# Patient Record
Sex: FEMALE | Race: WHITE | Employment: UNEMPLOYED | URBAN - METROPOLITAN AREA
[De-identification: names, ages, dates, MRNs, and addresses within clinical notes are randomized per-mention and may not be internally consistent; named-entity substitution may affect disease eponyms.]

---

## 2019-03-04 ENCOUNTER — OFFICE VISIT (OUTPATIENT)
Dept: FAMILY MEDICINE CLINIC | Facility: CLINIC | Age: 64
End: 2019-03-04

## 2019-03-04 VITALS — OXYGEN SATURATION: 98 % | SYSTOLIC BLOOD PRESSURE: 158 MMHG | HEART RATE: 72 BPM | DIASTOLIC BLOOD PRESSURE: 80 MMHG

## 2019-03-04 DIAGNOSIS — Z87.891 FORMER SMOKER: ICD-10-CM

## 2019-03-04 DIAGNOSIS — Z01.30 BLOOD PRESSURE CHECK: Primary | ICD-10-CM

## 2019-03-04 RX ORDER — FOSINOPRIL SODIUM 20 MG/1
20 TABLET ORAL DAILY
COMMUNITY

## 2019-03-04 RX ORDER — BISOPROLOL FUMARATE 5 MG/1
5 TABLET ORAL DAILY
COMMUNITY

## 2019-03-05 NOTE — PROGRESS NOTES
Pt here for courtesy blood pressure check. Blood pressure medication increased 4 days ago. Blood pressure this afternoon was 140s/101 at home after eating. Worried it was still going up and wanted it checked.   Pt denies any chest pain, SOB, or pain isha

## 2023-02-27 ENCOUNTER — APPOINTMENT (OUTPATIENT)
Dept: CT IMAGING | Facility: HOSPITAL | Age: 68
End: 2023-02-27
Attending: EMERGENCY MEDICINE
Payer: MEDICARE

## 2023-02-27 ENCOUNTER — HOSPITAL ENCOUNTER (INPATIENT)
Facility: HOSPITAL | Age: 68
LOS: 3 days | Discharge: HOME HEALTH | End: 2023-03-03
Attending: EMERGENCY MEDICINE | Admitting: HOSPITALIST
Payer: MEDICARE

## 2023-02-27 ENCOUNTER — HOSPITAL ENCOUNTER (INPATIENT)
Facility: HOSPITAL | Age: 68
LOS: 3 days | Discharge: HOME | End: 2023-03-03
Attending: EMERGENCY MEDICINE | Admitting: HOSPITALIST
Payer: MEDICARE

## 2023-02-27 DIAGNOSIS — M54.31 SCIATICA OF RIGHT SIDE: Primary | ICD-10-CM

## 2023-02-27 DIAGNOSIS — R52 INTRACTABLE PAIN: ICD-10-CM

## 2023-02-27 LAB
ALBUMIN SERPL-MCNC: 4 G/DL (ref 3.4–5)
ALBUMIN/GLOB SERPL: 1 {RATIO} (ref 1–2)
ALP LIVER SERPL-CCNC: 74 U/L
ALT SERPL-CCNC: 24 U/L
ANION GAP SERPL CALC-SCNC: 11 MMOL/L (ref 0–18)
AST SERPL-CCNC: 18 U/L (ref 15–37)
BASOPHILS # BLD AUTO: 0.08 X10(3) UL (ref 0–0.2)
BASOPHILS NFR BLD AUTO: 0.7 %
BILIRUB SERPL-MCNC: 0.4 MG/DL (ref 0.1–2)
BILIRUB UR QL: NEGATIVE
BUN BLD-MCNC: 33 MG/DL (ref 7–18)
BUN/CREAT SERPL: 25.6 (ref 10–20)
CALCIUM BLD-MCNC: 9.4 MG/DL (ref 8.5–10.1)
CHLORIDE SERPL-SCNC: 106 MMOL/L (ref 98–112)
CLARITY UR: CLEAR
CO2 SERPL-SCNC: 20 MMOL/L (ref 21–32)
CREAT BLD-MCNC: 1.29 MG/DL
DEPRECATED RDW RBC AUTO: 40.5 FL (ref 35.1–46.3)
EOSINOPHIL # BLD AUTO: 0.2 X10(3) UL (ref 0–0.7)
EOSINOPHIL NFR BLD AUTO: 1.6 %
ERYTHROCYTE [DISTWIDTH] IN BLOOD BY AUTOMATED COUNT: 12.4 % (ref 11–15)
GFR SERPLBLD BASED ON 1.73 SQ M-ARVRAT: 46 ML/MIN/1.73M2 (ref 60–?)
GLOBULIN PLAS-MCNC: 4 G/DL (ref 2.8–4.4)
GLUCOSE BLD-MCNC: 161 MG/DL (ref 70–99)
GLUCOSE UR-MCNC: NORMAL MG/DL
HCT VFR BLD AUTO: 40.7 %
HGB BLD-MCNC: 13.2 G/DL
HGB UR QL STRIP.AUTO: NEGATIVE
IMM GRANULOCYTES # BLD AUTO: 0.06 X10(3) UL (ref 0–1)
IMM GRANULOCYTES NFR BLD: 0.5 %
KETONES UR-MCNC: 100 MG/DL
LEUKOCYTE ESTERASE UR QL STRIP.AUTO: 500
LIPASE SERPL-CCNC: 123 U/L (ref 73–393)
LIPASE SERPL-CCNC: 31 U/L (ref 13–75)
LYMPHOCYTES # BLD AUTO: 1.91 X10(3) UL (ref 1–4)
LYMPHOCYTES NFR BLD AUTO: 15.7 %
MCH RBC QN AUTO: 28.8 PG (ref 26–34)
MCHC RBC AUTO-ENTMCNC: 32.4 G/DL (ref 31–37)
MCV RBC AUTO: 88.7 FL
MONOCYTES # BLD AUTO: 0.68 X10(3) UL (ref 0.1–1)
MONOCYTES NFR BLD AUTO: 5.6 %
NEUTROPHILS # BLD AUTO: 9.25 X10 (3) UL (ref 1.5–7.7)
NEUTROPHILS # BLD AUTO: 9.25 X10(3) UL (ref 1.5–7.7)
NEUTROPHILS NFR BLD AUTO: 75.9 %
NITRITE UR QL STRIP.AUTO: NEGATIVE
OSMOLALITY SERPL CALC.SUM OF ELEC: 295 MOSM/KG (ref 275–295)
PH UR: 5 [PH] (ref 5–8)
PLATELET # BLD AUTO: 286 10(3)UL (ref 150–450)
POTASSIUM SERPL-SCNC: 3.6 MMOL/L (ref 3.5–5.1)
PROT SERPL-MCNC: 8 G/DL (ref 6.4–8.2)
PROT UR-MCNC: 20 MG/DL
RBC # BLD AUTO: 4.59 X10(6)UL
SODIUM SERPL-SCNC: 137 MMOL/L (ref 136–145)
SP GR UR STRIP: 1.02 (ref 1–1.03)
UROBILINOGEN UR STRIP-ACNC: NORMAL
WBC # BLD AUTO: 12.2 X10(3) UL (ref 4–11)

## 2023-02-27 PROCEDURE — 74177 CT ABD & PELVIS W/CONTRAST: CPT | Performed by: EMERGENCY MEDICINE

## 2023-02-27 RX ORDER — HYDROCODONE BITARTRATE AND ACETAMINOPHEN 5; 325 MG/1; MG/1
1-2 TABLET ORAL EVERY 4 HOURS PRN
Qty: 15 TABLET | Refills: 0 | Status: SHIPPED | OUTPATIENT
Start: 2023-02-27 | End: 2023-03-03

## 2023-02-27 RX ORDER — MORPHINE SULFATE 4 MG/ML
4 INJECTION, SOLUTION INTRAMUSCULAR; INTRAVENOUS ONCE
Status: COMPLETED | OUTPATIENT
Start: 2023-02-27 | End: 2023-02-27

## 2023-02-27 RX ORDER — METHYLPREDNISOLONE 4 MG/1
TABLET ORAL
Qty: 1 EACH | Refills: 0 | Status: SHIPPED | OUTPATIENT
Start: 2023-02-27 | End: 2023-03-03

## 2023-02-27 RX ORDER — ONDANSETRON 2 MG/ML
4 INJECTION INTRAMUSCULAR; INTRAVENOUS ONCE
Status: COMPLETED | OUTPATIENT
Start: 2023-02-27 | End: 2023-02-27

## 2023-02-27 RX ORDER — KETOROLAC TROMETHAMINE 30 MG/ML
30 INJECTION, SOLUTION INTRAMUSCULAR; INTRAVENOUS ONCE
Status: COMPLETED | OUTPATIENT
Start: 2023-02-27 | End: 2023-02-27

## 2023-02-27 RX ORDER — DIAZEPAM 5 MG/ML
5 INJECTION, SOLUTION INTRAMUSCULAR; INTRAVENOUS ONCE
Status: COMPLETED | OUTPATIENT
Start: 2023-02-27 | End: 2023-02-27

## 2023-02-28 PROBLEM — R52 INTRACTABLE PAIN: Status: ACTIVE | Noted: 2023-02-28

## 2023-02-28 PROBLEM — M54.31 SCIATICA OF RIGHT SIDE: Status: ACTIVE | Noted: 2023-02-28

## 2023-02-28 LAB
ATRIAL RATE: 78 BPM
P AXIS: 57 DEGREES
P-R INTERVAL: 140 MS
Q-T INTERVAL: 420 MS
QRS DURATION: 84 MS
QTC CALCULATION (BEZET): 478 MS
R AXIS: 40 DEGREES
SARS-COV-2 RNA RESP QL NAA+PROBE: NOT DETECTED
T AXIS: 28 DEGREES
VENTRICULAR RATE: 78 BPM

## 2023-02-28 PROCEDURE — 99222 1ST HOSP IP/OBS MODERATE 55: CPT | Performed by: HOSPITALIST

## 2023-02-28 RX ORDER — MORPHINE SULFATE 2 MG/ML
2 INJECTION, SOLUTION INTRAMUSCULAR; INTRAVENOUS EVERY 2 HOUR PRN
Status: DISCONTINUED | OUTPATIENT
Start: 2023-02-28 | End: 2023-03-03

## 2023-02-28 RX ORDER — DEXAMETHASONE SODIUM PHOSPHATE 10 MG/ML
10 INJECTION, SOLUTION INTRAMUSCULAR; INTRAVENOUS ONCE
Status: COMPLETED | OUTPATIENT
Start: 2023-02-28 | End: 2023-02-28

## 2023-02-28 RX ORDER — LISINOPRIL 10 MG/1
10 TABLET ORAL DAILY
Status: DISCONTINUED | OUTPATIENT
Start: 2023-02-28 | End: 2023-03-03

## 2023-02-28 RX ORDER — METOPROLOL TARTRATE 50 MG/1
50 TABLET, FILM COATED ORAL
Status: DISCONTINUED | OUTPATIENT
Start: 2023-02-28 | End: 2023-03-03

## 2023-02-28 RX ORDER — DEXAMETHASONE SODIUM PHOSPHATE 4 MG/ML
4 VIAL (ML) INJECTION EVERY 12 HOURS
Status: DISCONTINUED | OUTPATIENT
Start: 2023-02-28 | End: 2023-03-03

## 2023-02-28 RX ORDER — DOCUSATE SODIUM 100 MG/1
100 CAPSULE, LIQUID FILLED ORAL 2 TIMES DAILY
Status: DISCONTINUED | OUTPATIENT
Start: 2023-02-28 | End: 2023-03-03

## 2023-02-28 RX ORDER — MAGNESIUM HYDROXIDE/ALUMINUM HYDROXICE/SIMETHICONE 120; 1200; 1200 MG/30ML; MG/30ML; MG/30ML
30 SUSPENSION ORAL 4 TIMES DAILY PRN
Status: DISCONTINUED | OUTPATIENT
Start: 2023-02-28 | End: 2023-03-03

## 2023-02-28 RX ORDER — DEXAMETHASONE SODIUM PHOSPHATE 4 MG/ML
6 VIAL (ML) INJECTION DAILY
Status: DISCONTINUED | OUTPATIENT
Start: 2023-02-28 | End: 2023-02-28

## 2023-02-28 RX ORDER — ACETAMINOPHEN 325 MG/1
650 TABLET ORAL EVERY 6 HOURS PRN
Status: DISCONTINUED | OUTPATIENT
Start: 2023-02-28 | End: 2023-03-03

## 2023-02-28 RX ORDER — LORAZEPAM 2 MG/ML
2 INJECTION INTRAMUSCULAR ONCE
Status: COMPLETED | OUTPATIENT
Start: 2023-02-28 | End: 2023-03-01

## 2023-02-28 RX ORDER — ZOLPIDEM TARTRATE 5 MG/1
5 TABLET ORAL NIGHTLY PRN
Status: DISCONTINUED | OUTPATIENT
Start: 2023-02-28 | End: 2023-03-03

## 2023-02-28 RX ORDER — ONDANSETRON 2 MG/ML
4 INJECTION INTRAMUSCULAR; INTRAVENOUS EVERY 6 HOURS PRN
Status: DISCONTINUED | OUTPATIENT
Start: 2023-02-28 | End: 2023-03-03

## 2023-02-28 RX ORDER — PANTOPRAZOLE SODIUM 40 MG/1
40 TABLET, DELAYED RELEASE ORAL
Status: DISCONTINUED | OUTPATIENT
Start: 2023-02-28 | End: 2023-03-03

## 2023-02-28 RX ORDER — MORPHINE SULFATE 4 MG/ML
4 INJECTION, SOLUTION INTRAMUSCULAR; INTRAVENOUS EVERY 2 HOUR PRN
Status: DISCONTINUED | OUTPATIENT
Start: 2023-02-28 | End: 2023-03-03

## 2023-02-28 RX ORDER — CYCLOBENZAPRINE HCL 10 MG
10 TABLET ORAL 3 TIMES DAILY PRN
Status: DISCONTINUED | OUTPATIENT
Start: 2023-02-28 | End: 2023-03-03

## 2023-02-28 RX ORDER — HYDRALAZINE HYDROCHLORIDE 20 MG/ML
10 INJECTION INTRAMUSCULAR; INTRAVENOUS EVERY 4 HOURS PRN
Status: DISCONTINUED | OUTPATIENT
Start: 2023-02-28 | End: 2023-03-03

## 2023-02-28 RX ORDER — HYDROCODONE BITARTRATE AND ACETAMINOPHEN 5; 325 MG/1; MG/1
1 TABLET ORAL EVERY 6 HOURS PRN
Status: DISCONTINUED | OUTPATIENT
Start: 2023-02-28 | End: 2023-03-03

## 2023-02-28 NOTE — ED QUICK NOTES
Pt unable to ambulate from cart to wheelchair. ED MD aware, plan of care is for pt to be admitted for pain control.

## 2023-02-28 NOTE — ED QUICK NOTES
Pt ambulatory to the bathroom with assistance with steady gait. Urine collected and sent to lab. Pt reports nausea, medicated per orders. Pt taken to CT in stable condition.

## 2023-02-28 NOTE — PLAN OF CARE
Alert and oriented x4. Right sided sciatica pain. 1 assist to bathroom. PRN Norco for pain and hydralazine for blood pressure. CT abdomen = negative. Decadron q 12. Plan for home once medically cleared. Problem: Patient Centered Care  Goal: Patient preferences are identified and integrated in the patient's plan of care  Description: Interventions:  - What would you like us to know as we care for you?  Home with son  - Provide timely, complete, and accurate information to patient/family  - Incorporate patient and family knowledge, values, beliefs, and cultural backgrounds into the planning and delivery of care  - Encourage patient/family to participate in care and decision-making at the level they choose  - Honor patient and family perspectives and choices  Outcome: Progressing     Problem: Patient/Family Goals  Goal: Patient/Family Long Term Goal  Description: Patient's Long Term Goal: safe ambulation    Interventions:  - calling for assistance out of bed  - See additional Care Plan goals for specific interventions  Outcome: Progressing  Goal: Patient/Family Short Term Goal  Description: Patient's Short Term Goal: pain management    Interventions:   - pain administration  - See additional Care Plan goals for specific interventions  Outcome: Progressing     Problem: PAIN - ADULT  Goal: Verbalizes/displays adequate comfort level or patient's stated pain goal  Description: INTERVENTIONS:  - Encourage pt to monitor pain and request assistance  - Assess pain using appropriate pain scale  - Administer analgesics based on type and severity of pain and evaluate response  - Implement non-pharmacological measures as appropriate and evaluate response  - Consider cultural and social influences on pain and pain management  - Manage/alleviate anxiety  - Utilize distraction and/or relaxation techniques  - Monitor for opioid side effects  - Notify MD/LIP if interventions unsuccessful or patient reports new pain  - Anticipate increased pain with activity and pre-medicate as appropriate  Outcome: Progressing     Problem: RISK FOR INFECTION - ADULT  Goal: Absence of fever/infection during anticipated neutropenic period  Description: INTERVENTIONS  - Monitor WBC  - Administer growth factors as ordered  - Implement neutropenic guidelines  Outcome: Progressing     Problem: SAFETY ADULT - FALL  Goal: Free from fall injury  Description: INTERVENTIONS:  - Assess pt frequently for physical needs  - Identify cognitive and physical deficits and behaviors that affect risk of falls.   - [place] fall precautions as indicated by assessment.  - Educate pt/family on patient safety including physical limitations  - Instruct pt to call for assistance with activity based on assessment  - Modify environment to reduce risk of injury  - Provide assistive devices as appropriate  - Consider OT/PT consult to assist with strengthening/mobility  - Encourage toileting schedule  Outcome: Progressing

## 2023-02-28 NOTE — ED QUICK NOTES
Pt updated on room assignment, transported to floor by this RN in stable condition. Pt remains A&Ox4, respirations even and unlabored, skin warm and dry.

## 2023-02-28 NOTE — PHYSICAL THERAPY NOTE
PT order received, chart reviewed. Spoke with RN Abhay Flores who approves participation. Patient presents sleeping VERY SOUNDLY in bed, sister at bedside. Pt does not awaken to her name or loud talking. Sister reports patient did not sleep well last night and just had some breakfast and fell asleep. Will check back later, RN Is aware.

## 2023-02-28 NOTE — DISCHARGE INSTRUCTIONS
Take medications as prescribed. You may take Tylenol as needed for pain. Take Norco as needed for worsening pain. Follow-up with your primary physician for further evaluation and treatment. Return to the emergency department if increasing pain, focal weakness, or other new symptoms develop. Sometimes managing your health at home requires assistance. The [facility] team has recognized your preference to use [facility]. They can be reached by phone at [phone]. The fax number for your reference is [phone]. A representative from the home health agency will contact you or your family to schedule your first visit.

## 2023-02-28 NOTE — OCCUPATIONAL THERAPY NOTE
OT orders received and chart reviewed. RN approving of pt participation in therapy. Pt received in bed sleeping soundly and not readily aroused to name/conversation. Per sister, pt had not slept well last night. OT eval deferred.  Will re attempt

## 2023-03-01 ENCOUNTER — APPOINTMENT (OUTPATIENT)
Dept: MRI IMAGING | Facility: HOSPITAL | Age: 68
End: 2023-03-01
Attending: HOSPITALIST
Payer: MEDICARE

## 2023-03-01 LAB
ANION GAP SERPL CALC-SCNC: 7 MMOL/L (ref 0–18)
BASOPHILS # BLD AUTO: 0.01 X10(3) UL (ref 0–0.2)
BASOPHILS NFR BLD AUTO: 0.1 %
BUN BLD-MCNC: 32 MG/DL (ref 7–18)
BUN/CREAT SERPL: 30.2 (ref 10–20)
CALCIUM BLD-MCNC: 9.2 MG/DL (ref 8.5–10.1)
CHLORIDE SERPL-SCNC: 110 MMOL/L (ref 98–112)
CO2 SERPL-SCNC: 24 MMOL/L (ref 21–32)
CREAT BLD-MCNC: 1.06 MG/DL
DEPRECATED RDW RBC AUTO: 43.5 FL (ref 35.1–46.3)
EOSINOPHIL # BLD AUTO: 0 X10(3) UL (ref 0–0.7)
EOSINOPHIL NFR BLD AUTO: 0 %
ERYTHROCYTE [DISTWIDTH] IN BLOOD BY AUTOMATED COUNT: 12.9 % (ref 11–15)
GFR SERPLBLD BASED ON 1.73 SQ M-ARVRAT: 58 ML/MIN/1.73M2 (ref 60–?)
GLUCOSE BLD-MCNC: 159 MG/DL (ref 70–99)
HCT VFR BLD AUTO: 38.7 %
HGB BLD-MCNC: 12.5 G/DL
IMM GRANULOCYTES # BLD AUTO: 0.11 X10(3) UL (ref 0–1)
IMM GRANULOCYTES NFR BLD: 0.7 %
LYMPHOCYTES # BLD AUTO: 1.17 X10(3) UL (ref 1–4)
LYMPHOCYTES NFR BLD AUTO: 7.3 %
MCH RBC QN AUTO: 29.6 PG (ref 26–34)
MCHC RBC AUTO-ENTMCNC: 32.3 G/DL (ref 31–37)
MCV RBC AUTO: 91.7 FL
MONOCYTES # BLD AUTO: 0.8 X10(3) UL (ref 0.1–1)
MONOCYTES NFR BLD AUTO: 5 %
NEUTROPHILS # BLD AUTO: 13.98 X10 (3) UL (ref 1.5–7.7)
NEUTROPHILS # BLD AUTO: 13.98 X10(3) UL (ref 1.5–7.7)
NEUTROPHILS NFR BLD AUTO: 86.9 %
OSMOLALITY SERPL CALC.SUM OF ELEC: 302 MOSM/KG (ref 275–295)
PLATELET # BLD AUTO: 270 10(3)UL (ref 150–450)
POTASSIUM SERPL-SCNC: 4.3 MMOL/L (ref 3.5–5.1)
PROCALCITONIN SERPL-MCNC: <0.02 NG/ML (ref ?–0.16)
RBC # BLD AUTO: 4.22 X10(6)UL
SODIUM SERPL-SCNC: 141 MMOL/L (ref 136–145)
WBC # BLD AUTO: 16.1 X10(3) UL (ref 4–11)

## 2023-03-01 PROCEDURE — 72148 MRI LUMBAR SPINE W/O DYE: CPT | Performed by: HOSPITALIST

## 2023-03-01 PROCEDURE — 99233 SBSQ HOSP IP/OBS HIGH 50: CPT | Performed by: HOSPITALIST

## 2023-03-01 NOTE — CM/SW NOTE
03/01/23 0900   CM/SW Screening   Referral [phone] staff; Chart review;Nursing rounds   Patient's Current Mental Status at Time of Assessment Alert;Oriented   Patient's [address]   Number of Levels in Home 1   Patient lives with Spouse/Significant other; Son   Patient Status Prior to Admission   Independent with ADLs and Mobility Yes   Discharge Needs   Anticipated D/C needs Home health care     Cm spoke with pt re dc needs and PT recs. Pt has no DME     and son both Rafael Langston is a , son works during the day but, is home at night. Pt is not agreeable to [facility]. Pt sts she has her sister/friend/cousin who can stay with her at [place]. Pt is agreeable to [facility]. PCP is Dr Sharyle Aus on [address] [place] [address] in [facility]. [address] [place] [address] to send ref, f2f for RN/PT done    Plan  Home with [facility] pending choice    / to remain available for support and/or discharge planning.      Leslye Diaz RN    Ext [phone]

## 2023-03-01 NOTE — PLAN OF CARE
Problem: Patient Centered Care  Goal: Patient preferences are identified and integrated in the patient's plan of care  Description: Interventions:  - What would you like us to know as we care for you. Lives with her  and he's her support system. - Provide timely, complete, and accurate information to patient/family  - Incorporate patient and family knowledge, values, beliefs, and cultural backgrounds into the planning and delivery of care  - Encourage patient/family to participate in care and decision-making at the level they choose  - Honor patient and family perspectives and choices  Outcome: Progressing     Problem: Patient/Family Goals  Goal: Patient/Family Long Term Goal  Description: Patient's Long Term Goal: Pain improved on her right lower back and able to walk well without any problems. Interventions:  - Up with walker as much as tolerated at home. - No bending, heavy lifting nor twisting above waist till cleared.  - PT/OT as recommended. - Pain management with oral medication.  - Report any other problems of back pain or walking.  - See additional Care Plan goals for specific interventions  Outcome: Progressing  Goal: Patient/Family Short Term Goal  Description: Patient's Short Term Goal: Home when stable. Interventions:   - PT/OT as ordered. - Pain management with oral medication.  - Up with walker as much as tolerated.   - Fall precaution.  - See additional Care Plan goals for specific interventions  Outcome: Progressing     Problem: PAIN - ADULT  Goal: Verbalizes/displays adequate comfort level or patient's stated pain goal  Description: INTERVENTIONS:  - Encourage pt to monitor pain and request assistance  - Assess pain using appropriate pain scale  - Administer analgesics based on type and severity of pain and evaluate response  - Implement non-pharmacological measures as appropriate and evaluate response  - Consider cultural and social influences on pain and pain management  - Manage/alleviate anxiety  - Utilize distraction and/or relaxation techniques  - Monitor for opioid side effects  - Notify MD/LIP if interventions unsuccessful or patient reports new pain  - Anticipate increased pain with activity and pre-medicate as appropriate  Outcome: Progressing     Problem: RISK FOR INFECTION - ADULT  Goal: Absence of fever/infection during anticipated neutropenic period  Description: INTERVENTIONS  - Monitor WBC  - Administer growth factors as ordered  - Implement neutropenic guidelines  Outcome: Progressing     Problem: SAFETY ADULT - FALL  Goal: Free from fall injury  Description: INTERVENTIONS:  - Assess pt frequently for physical needs  - Identify cognitive and physical deficits and behaviors that affect risk of falls.   - [place] fall precautions as indicated by assessment.  - Educate pt/family on patient safety including physical limitations  - Instruct pt to call for assistance with activity based on assessment  - Modify environment to reduce risk of injury  - Provide assistive devices as appropriate  - Consider OT/PT consult to assist with strengthening/mobility  - Encourage toileting schedule  Outcome: Progressing     Problem: DISCHARGE PLANNING  Goal: Discharge to home or other facility with appropriate resources  Description: INTERVENTIONS:  - Identify barriers to discharge w/pt and caregiver  - Include patient/family/discharge partner in discharge planning  - Arrange for needed discharge resources and transportation as appropriate  - Identify discharge learning needs (meds, wound care, etc)  - Arrange for interpreters to assist at discharge as needed  - Consider post-discharge preferences of patient/family/discharge partner  - Complete POLST form as appropriate  - Assess patient's ability to be responsible for managing their own health  - Refer to Case Management Department for coordinating discharge planning if the patient needs post-hospital services based on physician/LIP order or complex needs related to functional status, cognitive ability or social support system  Outcome: Progressing     Problem: MUSCULOSKELETAL - ADULT  Goal: Return mobility to safest level of function  Description: INTERVENTIONS:  - Assess patient stability and activity tolerance for standing, transferring and ambulating w/ or w/o assistive devices  - Assist with transfers and ambulation using safe patient handling equipment as needed  - Ensure adequate protection for wounds/incisions during mobilization  - Obtain PT/OT consults as needed  - Advance activity as appropriate  - Communicate ordered activity level and limitations with patient/family  Outcome: Progressing  Goal: Maintain proper alignment of affected body part  Description: INTERVENTIONS:  - Support and protect limb and body alignment per provider's orders  - Instruct and reinforce with patient and family use of appropriate assistive device and precautions (e.g. spinal or hip dislocation precautions)  Outcome: Progressing    Patient is alert and oriented, aware to call for help as needed. Patient is currently in room air, denies shortness of breathing nor chest pain. Patient is up with walker with minimal supervision from staff. Patient is voiding well, passing gas but denies having a bowel movement. Patient will be going home when stable.

## 2023-03-01 NOTE — PHYSICAL THERAPY NOTE
PHYSICAL THERAPY TREATMENT NOTE - INPATIENT     Room Number: 433/433-A       Presenting Problem: Intractable pain and inabiltiy to stand or ambulate, severe right sided sciatica/RLQ pain x 10 days    Problem List  Principal Problem:    Sciatica of right side  Active Problems:    Intractable pain      PHYSICAL THERAPY ASSESSMENT   Chart reviewed. RN  approved participation in physical therapy. PPE worn by therapist: mask, gloves and goggles. Patient was wearing a mask during session. Patient presented in bed with 6/10 pain. Patient with good  progress towards goals during this session. Education provided on Spine precautions, Physical therapy plan of care and physiological benefits of out of bed mobility. Patient with good carryover. Bed mobility: Min assist  Transfers: Min assist  Gait Assistance: Minimum assistance  Distance (ft): 2 x 100  Assistive Device: Rolling walker  Pattern: R Decreased stance time          Pt seen daily. Min a for bed mobility and transfer;Extra time provided to complete task. EOB sitting balance activity with emphasis on core stabilization. Spinal precautions reviewed;education;pt recall 2/3 spinal precautions. Pt amb 2 x 100 ft with RW and min a;provided cuing for gait pattern as well as for postural awareness. Navigated 4 stairs with min a. Gentle balance activity performed to maximize safety with functional mobility. Ther  Ex. Patient was left in bedside chair at end of session with all needs in reach. The patient's Approx Degree of Impairment: 54.16% has been calculated based on documentation in the [facility] '6 clicks' Inpatient Basic Mobility Short Form. Research supports that patients with this level of impairment may benefit from Undetermined. . RN aware of patient status post session. DISCHARGE RECOMMENDATIONS  PT Discharge Recommendations: Undetermined     PLAN  PT Treatment Plan: Bed mobility; Body mechanics    SUBJECTIVE  Pt reports being ready for PT RX    OBJECTIVE  Precautions: Spine    WEIGHT BEARING RESTRICTION  Weight Bearing Restriction: None                PAIN ASSESSMENT   Ratin  Location: right low back wrapping around pelvis to abdomen, L1-L2 dermatome  Management Techniques: Activity promotion; Body mechanics; Relaxation;Repositioning; Other (Comment) (use of ice, centralization of pain)    BALANCE                                                                                                                       Static Sitting: Good  Dynamic Sitting: Fair +           Static Standing: Poor +  Dynamic Standing: Poor +    ACTIVITY TOLERANCE                         O2 WALK       AM-PAC '6-Clicks' INPATIENT SHORT FORM - BASIC MOBILITY  How much difficulty does the patient currently have. .. Patient Difficulty: Turning over in bed (including adjusting bedclothes, sheets and blankets)?: A Little   Patient Difficulty: Sitting down on and standing up from a chair with arms (e.g., wheelchair, bedside commode, etc.): A Little   Patient Difficulty: Moving from lying on back to sitting on the side of the bed?: A Little   How much help from another person does the patient currently need. .. Help from Another: Moving to and from a bed to a chair (including a wheelchair)?: A Little   Help from Another: Need to walk in hospital room?: A Lot   Help from Another: Climbing 3-5 steps with a railing?: A Lot     AM-PAC Score:  Raw Score: 16   Approx Degree of Impairment: 54.16%   Standardized Score (AM-PAC Scale): 40.78   CMS Modifier (G-Code): CK      Additional information:   THERAPEUTIC EXERCISES  Lower Extremity Ankle pumps  Glut sets  Quad sets     Position Supine       Patient End of Session: Up in chair;Call light within reach;RN aware of session/findings;Bracing education provided per handout; All patient questions and concerns addressed    CURRENT GOALS       Patient Goal Patient's self-stated goal is: to not have pain and go home   Goal #1 Patient is able to demonstrate supine - sit EOB @ level: modified independent     Goal #1   Current Status Min a   Goal #2 Patient is able to demonstrate transfers Sit to/from Stand at assistance level: modified independent with walker - rolling     Goal #2  Current Status Min a   Goal #3 Patient is able to ambulate 100 feet with assist device: walker - rolling at assistance level: supervision   Goal #3   Current Status Pt amb 2 x 100 ft with RW and min a   Goal #4 Patient will negotiate 6 stairs/one curb w/ assistive device and supervision   Goal #4   Current Status Navigated 4 stairs with 2 HR/Min a   Goal #5 Patient to demonstrate independence with home activity/exercise instructions provided to patient in preparation for discharge.    Goal #5   Current Status    Goal #6    Goal #6  Current Status            PT Session Time: 45 minutes  Gait Training: 15 minutes  Therapeutic Activity: 15 minutes  Therapeutic Exercise: 15 minutes

## 2023-03-01 NOTE — PLAN OF CARE
Alert and oriented x4. Right sided sciatica pain. Standy by walker to bathroom. Complaints of pain. Blood pressure WNL. Norco and muscle relaxant for pain. Plan for MRI spine tomorrow. Home once medically cleared  Problem: Patient Centered Care  Goal: Patient preferences are identified and integrated in the patient's plan of care  Description: Interventions:  - What would you like us to know as we care for you?  Home with son  - Provide timely, complete, and accurate information to patient/family  - Incorporate patient and family knowledge, values, beliefs, and cultural backgrounds into the planning and delivery of care  - Encourage patient/family to participate in care and decision-making at the level they choose  - Honor patient and family perspectives and choices  Outcome: Progressing     Problem: Patient/Family Goals  Goal: Patient/Family Long Term Goal  Description: Patient's Long Term Goal: safe ambulation    Interventions:  - calling for assistance  - See additional Care Plan goals for specific interventions  Outcome: Progressing  Goal: Patient/Family Short Term Goal  Description: Patient's Short Term Goal: pain management    Interventions:   - pain administration  - See additional Care Plan goals for specific interventions  Outcome: Progressing     Problem: PAIN - ADULT  Goal: Verbalizes/displays adequate comfort level or patient's stated pain goal  Description: INTERVENTIONS:  - Encourage pt to monitor pain and request assistance  - Assess pain using appropriate pain scale  - Administer analgesics based on type and severity of pain and evaluate response  - Implement non-pharmacological measures as appropriate and evaluate response  - Consider cultural and social influences on pain and pain management  - Manage/alleviate anxiety  - Utilize distraction and/or relaxation techniques  - Monitor for opioid side effects  - Notify MD/LIP if interventions unsuccessful or patient reports new pain  - Anticipate increased pain with activity and pre-medicate as appropriate  Outcome: Progressing     Problem: RISK FOR INFECTION - ADULT  Goal: Absence of fever/infection during anticipated neutropenic period  Description: INTERVENTIONS  - Monitor WBC  - Administer growth factors as ordered  - Implement neutropenic guidelines  Outcome: Progressing     Problem: SAFETY ADULT - FALL  Goal: Free from fall injury  Description: INTERVENTIONS:  - Assess pt frequently for physical needs  - Identify cognitive and physical deficits and behaviors that affect risk of falls.   - [place] fall precautions as indicated by assessment.  - Educate pt/family on patient safety including physical limitations  - Instruct pt to call for assistance with activity based on assessment  - Modify environment to reduce risk of injury  - Provide assistive devices as appropriate  - Consider OT/PT consult to assist with strengthening/mobility  - Encourage toileting schedule  Outcome: Progressing

## 2023-03-01 NOTE — HOME CARE LIAISON
Received referral via [employer] for Home Health services. Spoke w/ patient and discussed list of [facility] providers from [address] [place] [address] Road, choice is Loc 33. Agency reserved in [address] [place] [address] Road and contact information placed on AVS.Financial interest disclosure provided. Notified CM/Iva.

## 2023-03-01 NOTE — CM/SW NOTE
Department  notified of request for [facility], aidin referrals started. Assigned CM/SW to follow up with pt/family on further discharge planning.      Ryanne Katz   March 01, 2023   09:23

## 2023-03-02 ENCOUNTER — APPOINTMENT (OUTPATIENT)
Dept: GENERAL RADIOLOGY | Facility: HOSPITAL | Age: 68
End: 2023-03-02
Attending: ANESTHESIOLOGY
Payer: MEDICARE

## 2023-03-02 LAB
ANION GAP SERPL CALC-SCNC: 5 MMOL/L (ref 0–18)
BASOPHILS # BLD AUTO: 0.02 X10(3) UL (ref 0–0.2)
BASOPHILS NFR BLD AUTO: 0.1 %
BUN BLD-MCNC: 33 MG/DL (ref 7–18)
BUN/CREAT SERPL: 37.9 (ref 10–20)
CALCIUM BLD-MCNC: 8.7 MG/DL (ref 8.5–10.1)
CHLORIDE SERPL-SCNC: 109 MMOL/L (ref 98–112)
CO2 SERPL-SCNC: 26 MMOL/L (ref 21–32)
CREAT BLD-MCNC: 0.87 MG/DL
DEPRECATED RDW RBC AUTO: 41.9 FL (ref 35.1–46.3)
EOSINOPHIL # BLD AUTO: 0 X10(3) UL (ref 0–0.7)
EOSINOPHIL NFR BLD AUTO: 0 %
ERYTHROCYTE [DISTWIDTH] IN BLOOD BY AUTOMATED COUNT: 12.7 % (ref 11–15)
GFR SERPLBLD BASED ON 1.73 SQ M-ARVRAT: 74 ML/MIN/1.73M2 (ref 60–?)
GLUCOSE BLD-MCNC: 132 MG/DL (ref 70–99)
HCT VFR BLD AUTO: 38.7 %
HGB BLD-MCNC: 12.6 G/DL
IMM GRANULOCYTES # BLD AUTO: 0.11 X10(3) UL (ref 0–1)
IMM GRANULOCYTES NFR BLD: 0.8 %
LYMPHOCYTES # BLD AUTO: 1.65 X10(3) UL (ref 1–4)
LYMPHOCYTES NFR BLD AUTO: 12.3 %
MAGNESIUM SERPL-MCNC: 2.4 MG/DL (ref 1.6–2.6)
MCH RBC QN AUTO: 29.4 PG (ref 26–34)
MCHC RBC AUTO-ENTMCNC: 32.6 G/DL (ref 31–37)
MCV RBC AUTO: 90.2 FL
MONOCYTES # BLD AUTO: 0.92 X10(3) UL (ref 0.1–1)
MONOCYTES NFR BLD AUTO: 6.9 %
NEUTROPHILS # BLD AUTO: 10.71 X10 (3) UL (ref 1.5–7.7)
NEUTROPHILS # BLD AUTO: 10.71 X10(3) UL (ref 1.5–7.7)
NEUTROPHILS NFR BLD AUTO: 79.9 %
OSMOLALITY SERPL CALC.SUM OF ELEC: 299 MOSM/KG (ref 275–295)
PHOSPHATE SERPL-MCNC: 3.1 MG/DL (ref 2.5–4.9)
PLATELET # BLD AUTO: 296 10(3)UL (ref 150–450)
POTASSIUM SERPL-SCNC: 3.9 MMOL/L (ref 3.5–5.1)
RBC # BLD AUTO: 4.29 X10(6)UL
SODIUM SERPL-SCNC: 140 MMOL/L (ref 136–145)
WBC # BLD AUTO: 13.4 X10(3) UL (ref 4–11)

## 2023-03-02 PROCEDURE — 3E0R33Z INTRODUCTION OF ANTI-INFLAMMATORY INTO SPINAL CANAL, PERCUTANEOUS APPROACH: ICD-10-PCS | Performed by: ANESTHESIOLOGY

## 2023-03-02 PROCEDURE — 99233 SBSQ HOSP IP/OBS HIGH 50: CPT | Performed by: HOSPITALIST

## 2023-03-02 RX ORDER — LIDOCAINE HYDROCHLORIDE 10 MG/ML
INJECTION, SOLUTION EPIDURAL; INFILTRATION; INTRACAUDAL; PERINEURAL AS NEEDED
Status: DISCONTINUED | OUTPATIENT
Start: 2023-03-02 | End: 2023-03-02 | Stop reason: HOSPADM

## 2023-03-02 RX ORDER — TRIAMCINOLONE ACETONIDE 40 MG/ML
INJECTION, SUSPENSION INTRA-ARTICULAR; INTRAMUSCULAR AS NEEDED
Status: DISCONTINUED | OUTPATIENT
Start: 2023-03-02 | End: 2023-03-02 | Stop reason: HOSPADM

## 2023-03-02 NOTE — PHYSICAL THERAPY NOTE
PHYSICAL THERAPY TREATMENT NOTE - INPATIENT     Room Number: 433/433-A       Presenting Problem: Intractable pain and inabiltiy to stand or ambulate, severe right sided sciatica/RLQ pain x 10 days    Problem List  Principal Problem:    Sciatica of right side  Active Problems:    Intractable pain      PHYSICAL THERAPY ASSESSMENT   Chart reviewed. RN  approved participation in physical therapy. PPE worn by therapist: mask, gloves and goggles. Patient was wearing a mask during session. Patient presented in bed with 6/10 pain. Patient with good  progress towards goals during this session. Education provided on Spine precautions, Physical therapy plan of care and physiological benefits of out of bed mobility. Patient with good carryover. Bed mobility: Min assist  Transfers: Min assist  Gait Assistance: Contact guard assist  Distance (ft): 2 x 100  Assistive Device: Rolling walker  Pattern: R Decreased stance time          Pt seen daily. Min a for bed mobility and transfer;Extra time provided to complete task. EOB sitting balance activity with emphasis on core stabilization. Spinal precautions reviewed;education;pt recall 3/3 spinal precautions. Pt amb 2 x 100 ft with RW and CGA;provided cuing for gait pattern as well as for postural awareness. Navigated  12 stairs with CGA. Gentle balance activity performed to maximize safety with functional mobility. Ther  Ex. Patient was left in bedside chair at end of session with all needs in reach. The patient's Approx Degree of Impairment: 46.58% has been calculated based on documentation in the [facility] '6 clicks' Inpatient Basic Mobility Short Form. Research supports that patients with this level of impairment may benefit from Undetermined. . RN aware of patient status post session. DISCHARGE RECOMMENDATIONS  PT Discharge Recommendations: Undetermined     PLAN  PT Treatment Plan: Bed mobility; Body mechanics; Endurance; Patient education;Gait training    SUBJECTIVE  Pt reports being ready for PT RX    OBJECTIVE  Precautions: Spine    WEIGHT BEARING RESTRICTION  Weight Bearing Restriction: None                PAIN ASSESSMENT   Ratin  Location: right low back wrapping around pelvis to abdomen, L1-L2 dermatome  Management Techniques: Activity promotion; Body mechanics; Relaxation;Repositioning; Other (Comment) (use of ice, centralization of pain)    BALANCE                                                                                                                       Static Sitting: Good  Dynamic Sitting: Fair +           Static Standing: Poor +  Dynamic Standing: Poor +    ACTIVITY TOLERANCE                         O2 WALK       AM-PAC '6-Clicks' INPATIENT SHORT FORM - BASIC MOBILITY  How much difficulty does the patient currently have. .. Patient Difficulty: Turning over in bed (including adjusting bedclothes, sheets and blankets)?: A Little   Patient Difficulty: Sitting down on and standing up from a chair with arms (e.g., wheelchair, bedside commode, etc.): A Little   Patient Difficulty: Moving from lying on back to sitting on the side of the bed?: A Little   How much help from another person does the patient currently need. .. Help from Another: Moving to and from a bed to a chair (including a wheelchair)?: A Little   Help from Another: Need to walk in hospital room?: A Little   Help from Another: Climbing 3-5 steps with a railing?: A Little     AM-PAC Score:  Raw Score: 18   Approx Degree of Impairment: 46.58%   Standardized Score (AM-PAC Scale): 43.63   CMS Modifier (G-Code): CK      Additional information:   THERAPEUTIC EXERCISES  Lower Extremity Ankle pumps  Glut sets  Quad sets     Position Supine       Patient End of Session: Up in chair;Call light within reach;RN aware of session/findings;Bracing education provided per handout; All patient questions and concerns addressed    CURRENT GOALS       Patient Goal Patient's self-stated goal is: to not have pain and go home   Goal #1 Patient is able to demonstrate supine - sit EOB @ level: modified independent     Goal #1   Current Status Min a   Goal #2 Patient is able to demonstrate transfers Sit to/from Stand at assistance level: modified independent with walker - rolling     Goal #2  Current Status Min a   Goal #3 Patient is able to ambulate 100 feet with assist device: walker - rolling at assistance level: supervision   Goal #3   Current Status Pt amb 2 x 100 ft with RW and  CGA   Goal #4 Patient will negotiate 6 stairs/one curb w/ assistive device and supervision   Goal #4   Current Status Navigated  12  stairs with 2 HR/Min a   Goal #5 Patient to demonstrate independence with home activity/exercise instructions provided to patient in preparation for discharge.    Goal #5   Current Status In progress   Goal #6    Goal #6  Current Status            PT Session Time: 30 minutes  Gait Training: 15 minutes  Therapeutic Activity: 15 minutes  Therapeutic Exercise: 0 minutes

## 2023-03-02 NOTE — PLAN OF CARE
Pt is A&O x4. Breathing on room air. SCDs for DVT prophylaxis. Given Norco prn for pain. Voiding freely. One small BM overnight. Up with standby assist and walker. Received IV Rocephin for UTI. D.C. plan is return home with Salomeelijahuriel Che when medically clear. Call light within reach. Safety precaution in place. Problem: Patient Centered Care  Goal: Patient preferences are identified and integrated in the patient's plan of care  Description: Interventions:  - What would you like us to know as we care for you. Lives with her  and he's her support system. - Provide timely, complete, and accurate information to patient/family  - Incorporate patient and family knowledge, values, beliefs, and cultural backgrounds into the planning and delivery of care  - Encourage patient/family to participate in care and decision-making at the level they choose  - Honor patient and family perspectives and choices  Outcome: Progressing     Problem: Patient/Family Goals  Goal: Patient/Family Long Term Goal  Description: Patient's Long Term Goal: Pain improved on her right lower back and able to walk well without any problems. Interventions:  - Up with walker as much as tolerated at home. - No bending, heavy lifting nor twisting above waist till cleared.  - PT/OT as recommended. - Pain management with oral medication.  - Report any other problems of back pain or walking.  - See additional Care Plan goals for specific interventions  Outcome: Progressing     Problem: Patient/Family Goals  Goal: Patient/Family Short Term Goal  Description: Patient's Short Term Goal: Home when stable. Interventions:   - PT/OT as ordered. - Pain management with oral medication.  - Up with walker as much as tolerated.   - Fall precaution.  - See additional Care Plan goals for specific interventions  Outcome: Progressing     Problem: PAIN - ADULT  Goal: Verbalizes/displays adequate comfort level or patient's stated pain goal  Description: INTERVENTIONS:  - Encourage pt to monitor pain and request assistance  - Assess pain using appropriate pain scale  - Administer analgesics based on type and severity of pain and evaluate response  - Implement non-pharmacological measures as appropriate and evaluate response  - Consider cultural and social influences on pain and pain management  - Manage/alleviate anxiety  - Utilize distraction and/or relaxation techniques  - Monitor for opioid side effects  - Notify MD/LIP if interventions unsuccessful or patient reports new pain  - Anticipate increased pain with activity and pre-medicate as appropriate  Outcome: Progressing     Problem: RISK FOR INFECTION - ADULT  Goal: Absence of fever/infection during anticipated neutropenic period  Description: INTERVENTIONS  - Monitor WBC  - Administer growth factors as ordered  - Implement neutropenic guidelines  Outcome: Progressing     Problem: SAFETY ADULT - FALL  Goal: Free from fall injury  Description: INTERVENTIONS:  - Assess pt frequently for physical needs  - Identify cognitive and physical deficits and behaviors that affect risk of falls.   - [place] fall precautions as indicated by assessment.  - Educate pt/family on patient safety including physical limitations  - Instruct pt to call for assistance with activity based on assessment  - Modify environment to reduce risk of injury  - Provide assistive devices as appropriate  - Consider OT/PT consult to assist with strengthening/mobility  - Encourage toileting schedule  Outcome: Progressing     Problem: DISCHARGE PLANNING  Goal: Discharge to home or other facility with appropriate resources  Description: INTERVENTIONS:  - Identify barriers to discharge w/pt and caregiver  - Include patient/family/discharge partner in discharge planning  - Arrange for needed discharge resources and transportation as appropriate  - Identify discharge learning needs (meds, wound care, etc)  - Arrange for interpreters to assist at discharge as needed  - Consider post-discharge preferences of patient/family/discharge partner  - Complete POLST form as appropriate  - Assess patient's ability to be responsible for managing their own health  - Refer to Case Management Department for coordinating discharge planning if the patient needs post-hospital services based on physician/LIP order or complex needs related to functional status, cognitive ability or social support system  Outcome: Progressing

## 2023-03-03 VITALS
SYSTOLIC BLOOD PRESSURE: 162 MMHG | RESPIRATION RATE: 18 BRPM | DIASTOLIC BLOOD PRESSURE: 81 MMHG | HEIGHT: 60 IN | OXYGEN SATURATION: 95 % | WEIGHT: 185 LBS | HEART RATE: 58 BPM | TEMPERATURE: 98 F | BODY MASS INDEX: 36.32 KG/M2

## 2023-03-03 LAB
ANION GAP SERPL CALC-SCNC: 5 MMOL/L (ref 0–18)
BASOPHILS # BLD AUTO: 0.03 X10(3) UL (ref 0–0.2)
BASOPHILS NFR BLD AUTO: 0.3 %
BUN BLD-MCNC: 24 MG/DL (ref 7–18)
BUN/CREAT SERPL: 27.9 (ref 10–20)
CALCIUM BLD-MCNC: 8.9 MG/DL (ref 8.5–10.1)
CHLORIDE SERPL-SCNC: 110 MMOL/L (ref 98–112)
CO2 SERPL-SCNC: 25 MMOL/L (ref 21–32)
CREAT BLD-MCNC: 0.86 MG/DL
DEPRECATED RDW RBC AUTO: 41.4 FL (ref 35.1–46.3)
EOSINOPHIL # BLD AUTO: 0.01 X10(3) UL (ref 0–0.7)
EOSINOPHIL NFR BLD AUTO: 0.1 %
ERYTHROCYTE [DISTWIDTH] IN BLOOD BY AUTOMATED COUNT: 12.4 % (ref 11–15)
GFR SERPLBLD BASED ON 1.73 SQ M-ARVRAT: 75 ML/MIN/1.73M2 (ref 60–?)
GLUCOSE BLD-MCNC: 164 MG/DL (ref 70–99)
HCT VFR BLD AUTO: 39.9 %
HGB BLD-MCNC: 13 G/DL
IMM GRANULOCYTES # BLD AUTO: 0.09 X10(3) UL (ref 0–1)
IMM GRANULOCYTES NFR BLD: 0.8 %
LYMPHOCYTES # BLD AUTO: 1.17 X10(3) UL (ref 1–4)
LYMPHOCYTES NFR BLD AUTO: 10 %
MCH RBC QN AUTO: 29.5 PG (ref 26–34)
MCHC RBC AUTO-ENTMCNC: 32.6 G/DL (ref 31–37)
MCV RBC AUTO: 90.5 FL
MONOCYTES # BLD AUTO: 0.63 X10(3) UL (ref 0.1–1)
MONOCYTES NFR BLD AUTO: 5.4 %
NEUTROPHILS # BLD AUTO: 9.75 X10 (3) UL (ref 1.5–7.7)
NEUTROPHILS # BLD AUTO: 9.75 X10(3) UL (ref 1.5–7.7)
NEUTROPHILS NFR BLD AUTO: 83.4 %
OSMOLALITY SERPL CALC.SUM OF ELEC: 298 MOSM/KG (ref 275–295)
PLATELET # BLD AUTO: 280 10(3)UL (ref 150–450)
POTASSIUM SERPL-SCNC: 4.1 MMOL/L (ref 3.5–5.1)
RBC # BLD AUTO: 4.41 X10(6)UL
SODIUM SERPL-SCNC: 140 MMOL/L (ref 136–145)
WBC # BLD AUTO: 11.7 X10(3) UL (ref 4–11)

## 2023-03-03 PROCEDURE — 99239 HOSP IP/OBS DSCHRG MGMT >30: CPT | Performed by: HOSPITALIST

## 2023-03-03 RX ORDER — METHYLPREDNISOLONE 4 MG/1
TABLET ORAL
Qty: 1 EACH | Refills: 0 | Status: SHIPPED | OUTPATIENT
Start: 2023-03-03

## 2023-03-03 RX ORDER — HYDROCODONE BITARTRATE AND ACETAMINOPHEN 5; 325 MG/1; MG/1
1-2 TABLET ORAL EVERY 4 HOURS PRN
Qty: 15 TABLET | Refills: 0 | Status: SHIPPED | OUTPATIENT
Start: 2023-03-03

## 2023-03-03 NOTE — PROCEDURES
Procedures right L 2 3 transforaminal epidural steroid injection under fluoroscopic guidance  All the risk and benefits of the procedure was discussed in detail she understood the risk in detail and she agreed to proceed  Patient was taken to the fluoroscopy suite placed in the prone position sterile prep and dressed in the usual fashion the inferior aspect of the right L2 pedicle was obtained and marked the oblique view 5 cc 1% lidocaine was used to anesthetize  Skin and subcutaneous tissue 22-gauge 3-spinal needle was inserted below the right L2 pedicle advanced in the AP view to felt to be in proper position  Negative aspirations 3 cc of Omnipaque were injected which showed good spread into the epidural space no aspiration of fluid no paresthesia no heme or CSF 80 mg of Kenalog and 3 cc normal saline were injected into the epidural space tolerated procedure well monitor probably images are stored and retrievable

## 2023-03-03 NOTE — PLAN OF CARE
Pt is A&Ox4. Clear for dc. Instructions given at bedside. Problem: Patient Centered Care  Goal: Patient preferences are identified and integrated in the patient's plan of care  Description: Interventions:  - What would you like us to know as we care for you. Lives with her  and he's her support system. - Provide timely, complete, and accurate information to patient/family  - Incorporate patient and family knowledge, values, beliefs, and cultural backgrounds into the planning and delivery of care  - Encourage patient/family to participate in care and decision-making at the level they choose  - Honor patient and family perspectives and choices  Outcome: Adequate for Discharge     Problem: Patient/Family Goals  Goal: Patient/Family Long Term Goal  Description: Patient's Long Term Goal: Pain improved on her right lower back and able to walk well without any problems. Interventions:  - Up with walker as much as tolerated at home. - No bending, heavy lifting nor twisting above waist till cleared.  - PT/OT as recommended. - Pain management with oral medication.  - Report any other problems of back pain or walking.  - See additional Care Plan goals for specific interventions  Outcome: Adequate for Discharge  Goal: Patient/Family Short Term Goal  Description: Patient's Short Term Goal: Home when stable. Interventions:   - PT/OT as ordered. - Pain management with oral medication.  - Up with walker as much as tolerated.   - Fall precaution.  - See additional Care Plan goals for specific interventions  Outcome: Adequate for Discharge     Problem: PAIN - ADULT  Goal: Verbalizes/displays adequate comfort level or patient's stated pain goal  Description: INTERVENTIONS:  - Encourage pt to monitor pain and request assistance  - Assess pain using appropriate pain scale  - Administer analgesics based on type and severity of pain and evaluate response  - Implement non-pharmacological measures as appropriate and evaluate response  - Consider cultural and social influences on pain and pain management  - Manage/alleviate anxiety  - Utilize distraction and/or relaxation techniques  - Monitor for opioid side effects  - Notify MD/LIP if interventions unsuccessful or patient reports new pain  - Anticipate increased pain with activity and pre-medicate as appropriate  Outcome: Adequate for Discharge     Problem: RISK FOR INFECTION - ADULT  Goal: Absence of fever/infection during anticipated neutropenic period  Description: INTERVENTIONS  - Monitor WBC  - Administer growth factors as ordered  - Implement neutropenic guidelines  Outcome: Adequate for Discharge     Problem: SAFETY ADULT - FALL  Goal: Free from fall injury  Description: INTERVENTIONS:  - Assess pt frequently for physical needs  - Identify cognitive and physical deficits and behaviors that affect risk of falls.   - [place] fall precautions as indicated by assessment.  - Educate pt/family on patient safety including physical limitations  - Instruct pt to call for assistance with activity based on assessment  - Modify environment to reduce risk of injury  - Provide assistive devices as appropriate  - Consider OT/PT consult to assist with strengthening/mobility  - Encourage toileting schedule  Outcome: Adequate for Discharge     Problem: DISCHARGE PLANNING  Goal: Discharge to home or other facility with appropriate resources  Description: INTERVENTIONS:  - Identify barriers to discharge w/pt and caregiver  - Include patient/family/discharge partner in discharge planning  - Arrange for needed discharge resources and transportation as appropriate  - Identify discharge learning needs (meds, wound care, etc)  - Arrange for interpreters to assist at discharge as needed  - Consider post-discharge preferences of patient/family/discharge partner  - Complete POLST form as appropriate  - Assess patient's ability to be responsible for managing their own health  - Refer to Case Management Department for coordinating discharge planning if the patient needs post-hospital services based on physician/LIP order or complex needs related to functional status, cognitive ability or social support system  Outcome: Adequate for Discharge     Problem: MUSCULOSKELETAL - ADULT  Goal: Return mobility to safest level of function  Description: INTERVENTIONS:  - Assess patient stability and activity tolerance for standing, transferring and ambulating w/ or w/o assistive devices  - Assist with transfers and ambulation using safe patient handling equipment as needed  - Ensure adequate protection for wounds/incisions during mobilization  - Obtain PT/OT consults as needed  - Advance activity as appropriate  - Communicate ordered activity level and limitations with patient/family  Outcome: Adequate for Discharge  Goal: Maintain proper alignment of affected body part  Description: INTERVENTIONS:  - Support and protect limb and body alignment per provider's orders  - Instruct and reinforce with patient and family use of appropriate assistive device and precautions (e.g. spinal or hip dislocation precautions)  Outcome: Adequate for Discharge

## 2023-03-03 NOTE — CDS QUERY
Dr. Kathleen Nova: . To answer this query: 1st click on \"Edit\" button on toolbar. 2nd type an \"X\" in the bracket for the diagnosis(s) that apply. (You may add/type in any additional clinical details you feel necessary to substantiate your response). 3rd Click \"SIGN\" TAB to complete your response. Thank you. Impaired Renal Function  CLINICAL DOCUMENTATION CLARIFICATION FORM    Dear Doctor Keenan  Documentation (provided below) includes acutely impaired renal function. For accurate ICD-10-CM code assignment to reflect severity of illness and risk of mortality,   PLEASE (X) The  DIAGNOSES THAT APPLIES   SELECTION BY PROVIDER ONLY    (   ) Acute Renal Failure   (   ) Chronic  Renal Failure              (   ) Other Explanation, (please specify): _________________________       Documentation includes Renal Failure  18[birth year]/23 74year old female, with a past medical history significant for hypertension presents with a complaint of right-sided back pain radiating down into the right hip aggravated by movement and no relieving factors. She rates her pain as an 8 out of 10 in severity now ongoing for the past 3 days however worsening a few hours prior to arrival to the ER.      (1) Date: 02/27/23 Creatinine 1.29 E GFR 46 BUN 33   (2) Date: 03/01/23 Creatinine 1.06 E GFR 58 BUN 32      (3) Date: 03/02/23 Creatinine 0.87 E GFR 74 BUN 33   (4) Date: 03/03/23 Creatinine 0.86 E GFR 75 BUN 24      Urinalysis report 02/27/23:  [facility] BACTERIA URINE (/HPF) RareA  [facility] RBC URINE (/HPF) 3-5A  [facility] URINE SQUAMOUS EPITHELIAL CELL (/HPF) FewA  [facility] URINE TRANSITIONAL EPITHELIAL (/HPF) FewA  [facility] WBC URINE (/HPF) 21-50A  [facility] URINE BILIRUBIN QUAL Negative  [facility] URINE BLOOD Negative  [facility] URINE CLARITY Clear  [facility] URINE COLOR Light-Yellow  [facility] URINE GLUCOSE QUAL (mg/dL) Normal  [facility] URINE KETONES (mg/dL) 100A  [facility] URINE LEUKOCYTE ESTERASE 500A  [facility] URINE NITRITE Negative  [facility] URINE PH 5.0  [facility] URINE PROTEIN QUAL (mg/dL) 20A  [facility] URINE SPECIFIC GRAVITY 1.024  [facility] URINE UROBILINOGEN Normal    02/27/23 Ucx FINAL RESULT: <100,000 Gram Positive Organisms-Probable Contamination     03/01/23 Your Progress Note: Renal failure improved. 03/02/23 Your Progress Note: Renal failure improved    Treatment: Monitoring Creatinine,  E GFR, BUN results    __________________________________________________________________________________          If you have any questions, please contact Clinical :  Gaston Fernández RN at [phone]   Thank You.     THIS FORM IS A PERMANENT PART OF THE MEDICAL RECORD

## 2023-03-03 NOTE — PHYSICAL THERAPY NOTE
PHYSICAL THERAPY TREATMENT NOTE - INPATIENT     Room Number: 433/433-A       Presenting Problem: Intractable pain and inabiltiy to stand or ambulate, severe right sided sciatica/RLQ pain x 10 days  Co-Morbidities : UTI,    Problem List  Principal Problem:    Sciatica of right side  Active Problems:    Intractable pain      PHYSICAL THERAPY ASSESSMENT     Pt seen daily. Chart reviewed;RN approved pt participation in PT RX. CGA for bed mobility and transfer. Spinal precautions reviewed. Education;pt recall 3/3 spinal precautions. Gentle balance activity performed to maximize safety with functional mobility. Pt amb 2 x 100ft with RW and CGA;provided cuing for gait pattern as well as for postural awareness. Navigated 12 stairs with CGA. Ther ex. Assisted pt to the bathroom;help with hygiene. Pt ended session sitting up in chair;call light within reach; Rn aware. Cooperative and motivated. .  The patient's Approx Degree of Impairment: 46.58% has been calculated based on documentation in the [facility] '6 clicks' Inpatient Basic Mobility Short Form. Research supports that patients with this level of impairment may benefit from Undetermined. DISCHARGE RECOMMENDATIONS  PT Discharge Recommendations: Undetermined     PLAN  PT Treatment Plan: Bed mobility; Endurance; Patient education;Gait training  Frequency (Obs): 5x/week    SUBJECTIVE  Less c/o pain. OBJECTIVE  Precautions: Spine    WEIGHT BEARING RESTRICTION                PAIN ASSESSMENT   Ratin  Location: right low back wrapping around pelvis to abdomen, L1-L2 dermatome  Management Techniques: Activity promotion; Body mechanics; Relaxation;Repositioning; Other (Comment) (use of ice, centralization of pain)    BALANCE  Static Sitting: Good  Dynamic Sitting: Fair +  Static Standing: Poor +  Dynamic Standing: Poor +    ACTIVITY TOLERANCE                          O2 WALK       AM-PAC '6-Clicks' INPATIENT SHORT FORM - BASIC MOBILITY  How much difficulty does the patient currently have... Patient Difficulty: Turning over in bed (including adjusting bedclothes, sheets and blankets)?: A Little   Patient Difficulty: Sitting down on and standing up from a chair with arms (e.g., wheelchair, bedside commode, etc.): A Little   Patient Difficulty: Moving from lying on back to sitting on the side of the bed?: A Little   How much help from another person does the patient currently need. .. Help from Another: Moving to and from a bed to a chair (including a wheelchair)?: A Little   Help from Another: Need to walk in hospital room?: A Little   Help from Another: Climbing 3-5 steps with a railing?: A Little     AM-PAC Score:  Raw Score: 18   Approx Degree of Impairment: 46.58%   Standardized Score (AM-PAC Scale): 43.63   CMS Modifier (G-Code): CK    FUNCTIONAL ABILITY STATUS  Functional Mobility/Gait Assessment  Gait Assistance: Contact guard assist  Distance (ft): 2 x 100  Assistive Device: Rolling walker  Pattern: R Decreased stance time  Stairs: Stairs  How Many Stairs: 12  Device: 2 Rails  Assist: Contact guard assist  Pattern: Ascend and Descend    Additional information:    THERAPEUTIC EXERCISES  Lower Extremity Ankle pumps  Glut sets  Quad sets     Position Supine       Patient End of Session: Up in chair;RN aware of session/findings;Call light within reach;Bracing education provided per handout; All patient questions and concerns addressed    CURRENT GOALS     Patient Goal Patient's self-stated goal is: to not have pain and go home   Goal #1 Patient is able to demonstrate supine - sit EOB @ level: modified independent     Goal #1   Current Status CGA   Goal #2 Patient is able to demonstrate transfers Sit to/from Stand at assistance level: modified independent with walker - rolling     Goal #2  Current Status CGA   Goal #3 Patient is able to ambulate 100 feet with assist device: walker - rolling at assistance level: supervision   Goal #3   Current Status Pt amb 2 x 100 ft with RW and  CGA   Goal #4 Patient will negotiate 6 stairs/one curb w/ assistive device and supervision   Goal #4   Current Status Navigated  12  stairs with 2 HR/ CGA   Goal #5 Patient to demonstrate independence with home activity/exercise instructions provided to patient in preparation for discharge.    Goal #5   Current Status In progress   Goal #6    Goal #6  Current Status            PT Session Time: 30 minutes  Gait Training: 15 minutes  Therapeutic Activity: 15 minutes  Therapeutic Exercise: 0 minutes

## 2023-03-03 NOTE — PLAN OF CARE
Alert and oriented x4. Right sided sciatica pain. Decadron q 12. Stand by to bathroom. Epidural steroid inject @ night. PRN norco and flexeril. BP WNL. Plan for home once medically cleared. Problem: Patient Centered Care  Goal: Patient preferences are identified and integrated in the patient's plan of care  Description: Interventions:  - What would you like us to know as we care for you. Lives with her  and he's her support system. - Provide timely, complete, and accurate information to patient/family  - Incorporate patient and family knowledge, values, beliefs, and cultural backgrounds into the planning and delivery of care  - Encourage patient/family to participate in care and decision-making at the level they choose  - Honor patient and family perspectives and choices  Outcome: Progressing     Problem: Patient/Family Goals  Goal: Patient/Family Long Term Goal  Description: Patient's Long Term Goal: Pain improved on her right lower back and able to walk well without any problems. Interventions:  - Up with walker as much as tolerated at home. - No bending, heavy lifting nor twisting above waist till cleared.  - PT/OT as recommended. - Pain management with oral medication.  - Report any other problems of back pain or walking.  - See additional Care Plan goals for specific interventions  Outcome: Progressing  Goal: Patient/Family Short Term Goal  Description: Patient's Short Term Goal: Home when stable. Interventions:   - PT/OT as ordered. - Pain management with oral medication.  - Up with walker as much as tolerated.   - Fall precaution.  - See additional Care Plan goals for specific interventions  Outcome: Progressing     Problem: PAIN - ADULT  Goal: Verbalizes/displays adequate comfort level or patient's stated pain goal  Description: INTERVENTIONS:  - Encourage pt to monitor pain and request assistance  - Assess pain using appropriate pain scale  - Administer analgesics based on type and severity of pain and evaluate response  - Implement non-pharmacological measures as appropriate and evaluate response  - Consider cultural and social influences on pain and pain management  - Manage/alleviate anxiety  - Utilize distraction and/or relaxation techniques  - Monitor for opioid side effects  - Notify MD/LIP if interventions unsuccessful or patient reports new pain  - Anticipate increased pain with activity and pre-medicate as appropriate  Outcome: Progressing     Problem: RISK FOR INFECTION - ADULT  Goal: Absence of fever/infection during anticipated neutropenic period  Description: INTERVENTIONS  - Monitor WBC  - Administer growth factors as ordered  - Implement neutropenic guidelines  Outcome: Progressing     Problem: SAFETY ADULT - FALL  Goal: Free from fall injury  Description: INTERVENTIONS:  - Assess pt frequently for physical needs  - Identify cognitive and physical deficits and behaviors that affect risk of falls.   - Orondo fall precautions as indicated by assessment.  - Educate pt/family on patient safety including physical limitations  - Instruct pt to call for assistance with activity based on assessment  - Modify environment to reduce risk of injury  - Provide assistive devices as appropriate  - Consider OT/PT consult to assist with strengthening/mobility  - Encourage toileting schedule  Outcome: Progressing     Problem: DISCHARGE PLANNING  Goal: Discharge to home or other facility with appropriate resources  Description: INTERVENTIONS:  - Identify barriers to discharge w/pt and caregiver  - Include patient/family/discharge partner in discharge planning  - Arrange for needed discharge resources and transportation as appropriate  - Identify discharge learning needs (meds, wound care, etc)  - Arrange for interpreters to assist at discharge as needed  - Consider post-discharge preferences of patient/family/discharge partner  - Complete POLST form as appropriate  - Assess patient's ability to be responsible for managing their own health  - Refer to Case Management Department for coordinating discharge planning if the patient needs post-hospital services based on physician/LIP order or complex needs related to functional status, cognitive ability or social support system  Outcome: Progressing     Problem: MUSCULOSKELETAL - ADULT  Goal: Return mobility to safest level of function  Description: INTERVENTIONS:  - Assess patient stability and activity tolerance for standing, transferring and ambulating w/ or w/o assistive devices  - Assist with transfers and ambulation using safe patient handling equipment as needed  - Ensure adequate protection for wounds/incisions during mobilization  - Obtain PT/OT consults as needed  - Advance activity as appropriate  - Communicate ordered activity level and limitations with patient/family  Outcome: Progressing  Goal: Maintain proper alignment of affected body part  Description: INTERVENTIONS:  - Support and protect limb and body alignment per provider's orders  - Instruct and reinforce with patient and family use of appropriate assistive device and precautions (e.g. spinal or hip dislocation precautions)  Outcome: Progressing

## 2023-03-06 ENCOUNTER — PATIENT OUTREACH (OUTPATIENT)
Dept: CASE MANAGEMENT | Age: 68
End: 2023-03-06

## 2023-03-06 NOTE — PROGRESS NOTES
TCM chart review. No TCM as patient follows with outside [facility] PCP. Encounter closing.

## 2023-11-02 NOTE — ED QUICK NOTES
Orders for admission, patient is aware of plan and ready to go upstairs. Any questions, please call ED RN Rupa Roman at extension [phone].      Patient Covid vaccination status: Unvaccinated     COVID Test Ordered in ED: Rapid SARS-CoV-2 by PCR    COVID Suspicion at Admission: N/A    Running Infusions:  None    Mental Status/LOC at time of transport: A&Ox4    Other pertinent information:   CIWA score: N/A   NIH score:  N/A No

## 2024-04-04 ENCOUNTER — OFFICE VISIT (OUTPATIENT)
Dept: NEUROLOGY | Age: 69
End: 2024-04-04

## 2024-04-04 VITALS
SYSTOLIC BLOOD PRESSURE: 155 MMHG | RESPIRATION RATE: 18 BRPM | DIASTOLIC BLOOD PRESSURE: 92 MMHG | HEART RATE: 86 BPM | HEIGHT: 63 IN

## 2024-04-04 DIAGNOSIS — I69.359 HEMIPARESIS FOLLOWING CEREBROVASCULAR ACCIDENT (CMD): ICD-10-CM

## 2024-04-04 DIAGNOSIS — Z86.79 HISTORY OF INTRACRANIAL HEMORRHAGE: ICD-10-CM

## 2024-04-04 DIAGNOSIS — I61.9 CVA (CEREBROVASCULAR ACCIDENT DUE TO INTRACEREBRAL HEMORRHAGE) (CMD): Primary | ICD-10-CM

## 2024-04-04 RX ORDER — ESCITALOPRAM OXALATE 10 MG/1
10 TABLET ORAL DAILY
Qty: 30 TABLET | Refills: 11 | Status: SHIPPED | OUTPATIENT
Start: 2024-04-04 | End: 2025-03-30

## 2024-04-17 RX ORDER — DIAZEPAM 5 MG/1
5 TABLET ORAL
Qty: 2 TABLET | Refills: 0 | Status: SHIPPED | OUTPATIENT
Start: 2024-04-17

## 2024-05-02 ENCOUNTER — APPOINTMENT (OUTPATIENT)
Dept: MRI IMAGING | Age: 69
End: 2024-05-02
Attending: PSYCHIATRY & NEUROLOGY

## 2024-05-02 DIAGNOSIS — I69.359 HEMIPARESIS FOLLOWING CEREBROVASCULAR ACCIDENT  (CMD): ICD-10-CM

## 2024-05-02 DIAGNOSIS — Z86.79 HISTORY OF INTRACRANIAL HEMORRHAGE: ICD-10-CM

## 2024-05-02 DIAGNOSIS — I61.9 CVA (CEREBROVASCULAR ACCIDENT DUE TO INTRACEREBRAL HEMORRHAGE)  (CMD): ICD-10-CM

## 2024-05-02 PROCEDURE — 70551 MRI BRAIN STEM W/O DYE: CPT | Performed by: RADIOLOGY

## 2024-05-02 PROCEDURE — 70547 MR ANGIOGRAPHY NECK W/O DYE: CPT | Performed by: RADIOLOGY

## 2024-05-02 PROCEDURE — 70544 MR ANGIOGRAPHY HEAD W/O DYE: CPT | Performed by: RADIOLOGY

## 2024-06-18 ENCOUNTER — OFFICE VISIT (OUTPATIENT)
Dept: NEUROLOGY | Facility: CLINIC | Age: 69
End: 2024-06-18

## 2024-06-18 DIAGNOSIS — M54.31 SCIATICA OF RIGHT SIDE: Primary | ICD-10-CM

## 2024-06-18 DIAGNOSIS — I61.9 NONTRAUMATIC INTRACEREBRAL HEMORRHAGE, UNSPECIFIED CEREBRAL LOCATION, UNSPECIFIED LATERALITY (HCC): ICD-10-CM

## 2024-06-18 DIAGNOSIS — R25.2 SPASTICITY AS LATE EFFECT OF CEREBROVASCULAR ACCIDENT (CVA): ICD-10-CM

## 2024-06-18 DIAGNOSIS — I69.398 SPASTICITY AS LATE EFFECT OF CEREBROVASCULAR ACCIDENT (CVA): ICD-10-CM

## 2024-06-18 PROCEDURE — 99204 OFFICE O/P NEW MOD 45 MIN: CPT | Performed by: OTHER

## 2024-06-18 RX ORDER — ESCITALOPRAM OXALATE 10 MG/1
10 TABLET ORAL DAILY
COMMUNITY

## 2024-06-18 RX ORDER — CARVEDILOL 6.25 MG/1
6.25 TABLET ORAL 2 TIMES DAILY WITH MEALS
COMMUNITY

## 2024-06-18 NOTE — PROGRESS NOTES
[facility] NEUROS[facility]ENC[facility] [address], [address]  [facility] [address]  [phone]            Neurology Initial Visit     Referred By: Dr. Jeter ref. provider found    Chief Complaint: No chief complaint on file.      HPI:     Federica Crowell is a 69 year old female, who presents for history of intracranial hemorrhage.  Patient with a history of hypertension, history of left thalamic intracerebral hemorrhage in December 2023 that left her with some right-sided weakness.  She had workup at that time and was felt that her hemorrhage was hypertensive in origin.  Blood pressure was better controlled.  She was at [facility] at that time.  Had physical and Occupational Therapy, speech therapy.   She followed up with neurologist in outside in April 2024, MRI of the brain and MRA of the head was ordered to assess for post hemorrhage status.  Started with Lexapro due to some depression symptoms, patient came to see us first time in June 2024.    In May 2024 she had MRI and MRA done, showed evidence of old moderate-sized left thalamic hemorrhagic infarct, extending into the left periventricular region.  Some microhemorrhages also noted, some mild generalized atrophy.  MRA of the head was not revealing in terms of any vascular malformation.    Apparently she regressed in terms of her improvement due to the fall in the bathtub and fracturing the humerus.  She was walking with a cane at home but not walking outside it.      No past medical history on file.    Past Surgical History:   Procedure Laterality Date    Removal gallbladder         Social history:  History   Smoking Status    Former   Smokeless Tobacco    Never     History   Alcohol Use No     History   Drug Use Unknown       No family history on file.      Current Outpatient Medications:     HYDROcodone-acetaminophen 5-325 MG Oral Tab, Take 1-2 tablets by mouth every 4 (four) hours as needed for Pain., Disp: 15 tablet, Rfl: 0     methylPREDNISolone (MEDROL) 4 MG Oral Tablet Therapy Pack, Dosepack: take as directed, Disp: 1 each, Rfl: 0    Fosinopril Sodium 20 MG Oral Tab, Take 0.5 tablets (10 mg total) by mouth daily., Disp: , Rfl:     Bisoprolol Fumarate 5 MG Oral Tab, Take 1 tablet (5 mg total) by mouth daily., Disp: , Rfl:     No Known Allergies    ROS:   As in HPI, the rest of the 14 system review was done and was negative      Physical Exam:  There were no vitals filed for this visit.    General: No apparent distress, well nourished, well groomed.  Head- Normocephalic, atraumatic  Eyes- No redness or swelling  ENT- Hearing intake, normal glutition  Neck- No masses or adenopathy  Cv: pulses were palpable and normal, no cyanosis or edema     Neurological:     Mental Status- Alert unable to assess fully due to the language barrier    Cranial Nerves:  V. Facial sensation decreased on the right side  VII. Face slight asymmetry on the right side  VIII. Hearing intact to whisper.  IX. Pallet elevates symmetrically.  XI. Shoulder shrug is intact  XII. Tongue is midline    Motor Exam:  Muscle tone increased especially in the right upper extremity, some increase of tone in the right lower extremity  No atrophy or fasciculations  Strength- upper extremities 5/5 proximally and distally on the left and essentially 0 out of 5 on the right                  - lower  extremities 5/5 proximally and distally on the left and 4 out of 5 on the right    Sensory Exam:  Light touch sensation-decreased on the right side    Sitting in the wheelchair    Labs:    No results found for: \"TSH\"  No results found for: \"CHOL\", \"HDL\", \"LDL\", \"TRIG\"  Lab Results   Component Value Date    HGB 13.0 03/03/2023    HCT 39.9 03/03/2023    MCV 90.5 03/03/2023    WBC 11.7 (H) 03/03/2023    .0 03/03/2023      Lab Results   Component Value Date    BUN 24 (H) 03/03/2023    CA 8.9 03/03/2023    ALT 24 02/27/2023    AST 18 02/27/2023    ALB 4.0 02/27/2023     03/03/2023     K 4.1 03/03/2023     03/03/2023    CO2 25.0 03/03/2023      I have reviewed labs.      Assessment   1. Sciatica of right side      2. Nontraumatic intracerebral hemorrhage, unspecified cerebral location, unspecified laterality (HCC)  Hypertensive in origin.  Again emphasized importance of tight blood pressure control, she will write down daily numbers for the next week and bring it through her PCP next week, follow-up appointment.  Emphasize importance of resuming physical therapy.  If spasticity gets worse or does improve significantly we might consider doing Botox injections in the right upper extremity in the future           Education and counseling provided to patient. Instructed patient to call my office or seek medical attention immediately if symptoms worsen.  Patient verbalized understanding of information given. All questions were answered. All side effects of drugs were discussed.     Return to clinic in: No follow-ups on file.    Thiago Nieves MD

## 2024-06-21 ENCOUNTER — TELEPHONE (OUTPATIENT)
Dept: NEUROLOGY | Facility: CLINIC | Age: 69
End: 2024-06-21

## 2024-06-21 ENCOUNTER — MED REC SCAN ONLY (OUTPATIENT)
Dept: NEUROLOGY | Facility: CLINIC | Age: 69
End: 2024-06-21

## 2024-06-21 NOTE — TELEPHONE ENCOUNTER
Disability Placard has been completed and mailed to the patient.  Copy has been sent for scanning.  Reviewed and electronically signed by: STEPAN Collins

## 2024-07-01 DIAGNOSIS — I61.9 CVA (CEREBROVASCULAR ACCIDENT DUE TO INTRACEREBRAL HEMORRHAGE)  (CMD): Primary | ICD-10-CM

## 2024-07-01 DIAGNOSIS — Z86.79 HISTORY OF INTRACRANIAL HEMORRHAGE: ICD-10-CM

## 2024-07-01 DIAGNOSIS — I69.359 HEMIPARESIS FOLLOWING CEREBROVASCULAR ACCIDENT  (CMD): ICD-10-CM

## 2024-07-10 ENCOUNTER — EXTERNAL RECORD (OUTPATIENT)
Dept: HEALTH INFORMATION MANAGEMENT | Facility: OTHER | Age: 69
End: 2024-07-10

## 2024-07-15 ENCOUNTER — CLINICAL DOCUMENTATION (OUTPATIENT)
Dept: NEUROLOGY | Age: 69
End: 2024-07-15

## 2024-07-25 ENCOUNTER — TELEPHONE (OUTPATIENT)
Dept: NEUROLOGY | Age: 69
End: 2024-07-25

## 2024-07-30 ENCOUNTER — EXTERNAL RECORD (OUTPATIENT)
Dept: HEALTH INFORMATION MANAGEMENT | Facility: OTHER | Age: 69
End: 2024-07-30

## 2024-11-08 ENCOUNTER — ORDER TRANSCRIPTION (OUTPATIENT)
Dept: PHYSICAL THERAPY | Facility: HOSPITAL | Age: 69
End: 2024-11-08

## 2024-11-08 DIAGNOSIS — I69.398 SPASTICITY AS LATE EFFECT OF CEREBROVASCULAR ACCIDENT (CVA): Primary | ICD-10-CM

## 2024-11-08 DIAGNOSIS — R25.2 SPASTICITY AS LATE EFFECT OF CEREBROVASCULAR ACCIDENT (CVA): Primary | ICD-10-CM

## 2024-11-13 ENCOUNTER — TELEPHONE (OUTPATIENT)
Dept: PHYSICAL THERAPY | Facility: HOSPITAL | Age: 69
End: 2024-11-13

## 2024-11-18 ENCOUNTER — OFFICE VISIT (OUTPATIENT)
Dept: PHYSICAL THERAPY | Facility: HOSPITAL | Age: 69
End: 2024-11-18
Attending: Other
Payer: MEDICARE

## 2024-11-18 DIAGNOSIS — I69.398 SPASTICITY AS LATE EFFECT OF CEREBROVASCULAR ACCIDENT (CVA): Primary | ICD-10-CM

## 2024-11-18 DIAGNOSIS — R25.2 SPASTICITY AS LATE EFFECT OF CEREBROVASCULAR ACCIDENT (CVA): Primary | ICD-10-CM

## 2024-11-18 PROCEDURE — 97161 PT EVAL LOW COMPLEX 20 MIN: CPT

## 2024-11-18 NOTE — PROGRESS NOTES
NEUROLOGICAL PHYSICAL THERAPY EVALUATION:   Referring Provider:Mimi  Diagnosis: L hemiparesis, difficulty walking, hx of stroke      Date of Onset: per HPI Date of Service: 11/18/2024     PATIENT SUMMARY   Federica Crowell is a 69 year old y/o female who presents to therapy today with reports of impaired gait and strength 2/2 hx of stroke.     History of current condition: Pt reports hx of stroke in early December 2023. Pt reports going to acute rehab for ~1 month before being discharged home with  services. Attended outpatient therapies at an outside facility. Underwent Botox injections in her RUE and RLE over the summer before going [place] of [place] country -did additional therapy at this time for ~10 weeks which was somewhat helpful. Since returning to the country she has undergone additional Botox injections. She will undergo her third round of injections in January- had most recent Botox injections at the end of last month. Referred to outpatient rehab services by her neurologist.     Current functional limitations include imbalance with walking, difficulty walking, L hemiparesis, some difficulty arising from chair.   Social History: Lives with  and son in a house - [address] (rail). 1 level home but 5 steps to main level (rail).  7-8 steps to basement (rail). Retired   Pt describes pain level:  denies pain  Falls: denies falls in past 6 months   Prior level of function unlimited.  Pt goals include to improve walking.  Past medical history was reviewed with Federica. Significant findings include: HTN, stroke.          ASSESSMENT:    Federica presents today with hx of stroke resulting in L hemiparesis negatively affecting functional mobility and level of independence. Of note pt recently underwent Botox injections of the LUE and LLE. She plans to start OT soon to address UE deficits. She is considered a fall risk based on TUG and 5 times sit to stand. She demonstrates a step to gait pattern with use of SBQC  negatively affecting efficiency of gait and gait speed. She would benefit from an adjustment of her AFO as hers is currently too wide around the ankle which may result in decreased stability during ambulation. Referring physician will be contacted for order. Pt and  verbalize understanding of material taught today. Additional objective findings below.       Pt. would benefit from skilled Physical Therapy to address the above impairments to improve safety and efficiency of functional mobility in order to facilitate return to PLOF.     Precautions: Fall Risk      OBJECTIVE:   Observation/Posture: pleasant 70 y/o; NAD. Declines    Sensation: denies numbness and tingling- experiences L carmencita body tingling intermittently  light touch sensation grossly intact BUE and BLE    ROM:WFL BLE    Strength:   Strength (-/5)    R L   Hip       Flexion 3- 4   Knee       Flexion (Seated) 2- 5     Extension 4+ 5   Foot / Ankle        DF 1+ 5     PF (seated)  0 5   INV 1+ 5   EVR 0 5         Postural Control:  BBS: TBA    Functional Mobility:  5x Sit to Stand: 33 seconds   Gait speed: TBA  Gait deviations: very slow gait speed, step to gait pattern with use of SBQC   Timed Up and Go (AD, time): 62.5 sec SBQC   Fall Risk: yes      Today’s Treatment and Response: Pt education provided on exam findings and POC. Discussed need for modifications of AFO for better fit- explained process for this. Encouraged increased activity throughout day. HEP to be initiated next session.     Charges: PT Eval x1      Total Timed Treatment: 40 min     Total Treatment Time: 40 min         PLAN OF CARE:    Goals to be met within POC or 90 days:  Pt to be independent in HEP   Pt will improve functional gait speed in order to perform TUG in 55 seconds or less.   Pt will improve functional strength in BLE in order to perform 5 times sit to  25 seconds or less.   Pt will improve L hip flexor strength to at least 3+/5 in order allow for  increased efficiency and safety of ambulation.     Frequency / Duration: Patient will be seen for 2 x/week or a total of 10 visits over a 90 day period.  Treatment will include: Balance Training, Gait Training,  Therapeutic Exercises; Neuromuscular Re-education; Therapeutic Activity; Patient education; Home exercise program instruction.      Education or treatment limitation: None  Rehab Potential:good    Patient/Family/Caregiver was advised of these findings, precautions, and treatment options and has agreed to actively participate in planning and for this course of care.    Thank you for your referral.  If you have any questions, please contact me at Dept: [phone]    Sincerely,  SIMRAN Genao PT      Electronically signed by therapist: Kaylie Barros PT, NCS    Physician's certification required: Yes  I certify the need for these services furnished under this plan of treatment and while under my care.    X___________________________________________________ Date____________________    Certification From: 11/18/2024  To:2/16/2025 21st Century Cures Act Notice to Patient: Medical documents like this are made available to patients in the interest of transparency. However, be advised this is a medical document and it is intended as [id number] communication between your medical providers. This medical document may contain abbreviations, assessments, medical data, and results or other terms that are unfamiliar. Medical documents are intended to carry relevant information, facts as evident, and the clinical opinion of the practitioner. As such, this medical document may be written in language that appears blunt or direct. You are encouraged to contact your medical provider and/or [facility] Patient Experience if you have any questions about this medical document.

## 2024-11-19 ENCOUNTER — TELEPHONE (OUTPATIENT)
Dept: PHYSICAL THERAPY | Facility: HOSPITAL | Age: 69
End: 2024-11-19

## 2024-11-20 ENCOUNTER — ORDER TRANSCRIPTION (OUTPATIENT)
Dept: PHYSICAL THERAPY | Facility: HOSPITAL | Age: 69
End: 2024-11-20

## 2024-11-20 DIAGNOSIS — R25.2 SPASTICITY AS LATE EFFECT OF CEREBROVASCULAR ACCIDENT (CVA): Primary | ICD-10-CM

## 2024-11-20 DIAGNOSIS — I69.398 SPASTICITY AS LATE EFFECT OF CEREBROVASCULAR ACCIDENT (CVA): Primary | ICD-10-CM

## 2024-11-21 ENCOUNTER — APPOINTMENT (OUTPATIENT)
Dept: PHYSICAL THERAPY | Facility: HOSPITAL | Age: 69
End: 2024-11-21
Attending: Other
Payer: MEDICARE

## 2024-11-21 ENCOUNTER — TELEPHONE (OUTPATIENT)
Dept: PHYSICAL THERAPY | Facility: HOSPITAL | Age: 69
End: 2024-11-21

## 2024-12-03 ENCOUNTER — OFFICE VISIT (OUTPATIENT)
Dept: PHYSICAL THERAPY | Facility: HOSPITAL | Age: 69
End: 2024-12-03
Attending: Other
Payer: MEDICARE

## 2024-12-03 PROCEDURE — 97110 THERAPEUTIC EXERCISES: CPT

## 2024-12-03 NOTE — PATIENT INSTRUCTIONS
Walk for at least 5 minutes 2-3 times a day for no other reason but to walk.     Do these exercises 2 times a day    Sit up tall with feet on floor. Loop belt around thighs just above your knees. Separate knees pushing legs out into belt as hard as you can for 3-5 seconds before relaxing. Do this 15 times.     Sit up tall with feet on floor. March for 30 seconds. Rest for 30 seconds. Then repeat for an additional 30 seconds.     Lie on your back. Right knee bent a small amount. Press heel into bed as you try to pull heel towards your bottom but you will not actually move. You are just tightening the back of your leg. Hold this for 3-5 seconds then relax. Do this 15 times.     Lie on your back. Bend right hip and knee this time moving your heel towards your bottom. Then slowly straighten. Try to keep your knee pointed up towards the ceiling as you move. Do this 20 times.

## 2024-12-03 NOTE — PROGRESS NOTES
Diagnosis: L hemiparesis, difficulty walking, hx of stroke     Visit (# authorized):   10 per POC (Medicare)                       Referring Provider: Mimi    Precautions: at risk of falls     Pain: 0/10    Subjective: No new information to report.     Objective:    Date  12/3          Visit Number  2          NMR           Ther EX x          Ther Act           Gait Training           CRM            Manual             Additional Treatment Information:    Therapeutic Exercise (39 mins):  Nu-step level 2 BLE only x10 mins -belt around thighs  Seated    Hip ABD isometrics 3-5 sec hold x15    March 30 sec x2 bouts   Supine:    Hamstring set 3-5 sec hold hand over hand assist R foot to improve performance x15 reps R only   Heel slide x20 RLE only -hand over hand assist to maintain R foot contact on bed   Ambulation multiple bouts in session with SBQC, various distances       Patient Education:  HEP provided- see pt instructions       Assessment:   Pt demo's understanding of exercises provided for home. Pt and  verbalize understanding of material taught. Pt appears fatigued at end of session.     Plan: BBS, add balance exercises to HEP     Charges: 3 TE       Total Timed Treatment: 39 min  Total Treatment Time: 39 min      21st Century Cures Act Notice to Patient: Medical documents like this are made available to patients in the interest of transparency. However, be advised this is a medical document and it is intended as [id number] communication between your medical providers. This medical document may contain abbreviations, assessments, medical data, and results or other terms that are unfamiliar. Medical documents are intended to carry relevant information, facts as evident, and the clinical opinion of the practitioner. As such, this medical document may be written in language that appears blunt or direct. You are encouraged to contact your medical provider and/or [facility] Patient Experience if  you have any questions about this medical document.

## 2024-12-05 ENCOUNTER — OFFICE VISIT (OUTPATIENT)
Dept: PHYSICAL THERAPY | Facility: HOSPITAL | Age: 69
End: 2024-12-05
Attending: Other
Payer: MEDICARE

## 2024-12-05 PROCEDURE — 97110 THERAPEUTIC EXERCISES: CPT

## 2024-12-05 PROCEDURE — 97112 NEUROMUSCULAR REEDUCATION: CPT

## 2024-12-05 NOTE — PATIENT INSTRUCTIONS
Walk for at least 5 minutes 2-3 times a day for no other reason but to walk.     Do these exercises 2 times a day for strength    Sit up tall with feet on floor. Loop belt around thighs just above your knees. Separate knees pushing legs out into belt as hard as you can for 3-5 seconds before relaxing. Do this 15 times.     Sit up tall with feet on floor. March for 30 seconds. Rest for 30 seconds. Then repeat for an additional 30 seconds.     Lie on your back. Right knee bent a small amount. Press heel into bed as you try to pull heel towards your bottom but you will not actually move. You are just tightening the back of your leg. Hold this for 3-5 seconds then relax. Do this 15 times.     Lie on your back. Bend right hip and knee this time moving your heel towards your bottom. Then slowly straighten. Try to keep your knee pointed up towards the ceiling as you move. Do this 20 times.     Do these exercises 1 time a day for balance     Stand with bed or couch behind you for safety (do not lean against it)  Interlace hands. Lift arms up without leaning body back.  Then lower arms. Do this 10 times.     Interlace hands. Hold arms out in front of you. Rotate body and head to the right then the left. Do this 10 times, move slow and controlled.     Stand up tall. Put feet close together so they are touching. Balance with eyes open and arms at your sides for 1 minute.     Stand up tall. Feet normal distance apart. Balance with eyes closed and arms at your sides for 15 seconds.

## 2024-12-05 NOTE — PROGRESS NOTES
Diagnosis: L hemiparesis, difficulty walking, hx of stroke     Visit (# authorized):   10 per POC (Medicare)                       Referring Provider: Mimi    Precautions: at risk of falls     Pain: 0/10    Subjective: No new information to report.     Objective:    Date  12/3 12/5         Visit Number  2 3         NMR  x         Ther EX x x         Ther Act           Gait Training           CRM            Manual             Additional Treatment Information:    NMR (24 mins):   Frank Balance Scale     Item Description Score (0 worst-4 best)    ___4___1. Sitting to standing  ___4___2. Standing unsupported   ___4___3. Sitting unsupported   ___4___4. Standing to sitting   ___2___5. Transfers   ___4___6. Standing with eyes closed   ___1___7. Standing with feet together -1 minute  ___2___8. Reaching forward with outstretched arm   ___1___9. Retrieving object from floor   ___3__10. Turning to look behind   ___1__11. Turning 360 degrees   ___0__12. Placing alternate foot on stool   ___1__13. Standing with one foot in front   ___0__14. Standing on one foot     Total __31_/56 (less than 46/56 = fall risk)    Therapeutic Exercise (15 mins):  Nu-step level 2 BLE only x10 mins -belt around thighs  Ambulation 2 bouts in session with SBQC, various distances -VC's for increased symmetry of steps       Patient Education:  HEP provided- see pt instructions       Assessment:   Pt demo's understanding of exercises provided for home.  Some self limiting behaviors in regards to balance exercises when challenged. Is considered a fall risk based on BBS.     Plan: in future sessions: shuttle, TDM training, sit<>stand training     Charges: 1 TE, 2 NMR       Total Timed Treatment: 39 min  Total Treatment Time: 39 min      21st Century Cures Act Notice to Patient: Medical documents like this are made available to patients in the interest of transparency. However, be advised this is a medical document and it is intended as [id number]  communication between your medical providers. This medical document may contain abbreviations, assessments, medical data, and results or other terms that are unfamiliar. Medical documents are intended to carry relevant information, facts as evident, and the clinical opinion of the practitioner. As such, this medical document may be written in language that appears blunt or direct. You are encouraged to contact your medical provider and/or [facility] Patient Experience if you have any questions about this medical document.

## 2024-12-07 NOTE — PROGRESS NOTES
OCCUPATIONAL THERAPY NEUROLOGICAL EVALUATION:.     Diagnosis:   Spasticity as late effect of cerebrovascular accident (CVA) (I69.398,R25.2)       Referring Provider: Issa Le  Date of Evaluation:    12/6/2024    Precautions:   Hx of CVA , HTN Next MD visit:   none scheduled  Date of Surgery: n/a     PATIENT SUMMARY   Federica Crowell is a 69 year old female who presents to therapy today with complaints of R hemiparesis.  Hx of Condition: On 12/10/23, pt woke up with pain and inability to walk as well as difficulty with her RUE.  decided to call 911, pt was admitted for a CVA resulting in R hemiparesis. Pt was admitted from 12/10-18th to [facility] then transferred to [facility] from 12/18-01/16. Pt participated in  rehab. Pt has been in and out of rehab services for the last year at multiple facilities. Pt has a hx of Botox for two rounds. Last Botox was in October 2024 and she is scheduled for her next Botox January.     Pt describes pain level: current 0/10, best 0/10, worst 0/10.    Current functional limitations include ambulating with cane, long distances using w/c,  assisting with all adls and iadls.  Federica describes prior level of function independent in adl. Pt goals include regain more functioning within her RUE.  Past medical history was reviewed with Federica. Significant findings include No past medical history on file.      ASSESSMENT  Federica presents to occupational therapy evaluation with primary c/o Right hemiparesis. At this time pt has active initiation of her trapezius, levator scapulae, and rhomboids resulting in ability to elevate and retract her R scapula. Pt is also able to minimally abduct her R shoulder through activation of her deltoid. The results of the objective tests and measures show min AROM of pt's R shoulder, no AROM of her elbow, wrist, and/or digits at this time. Functional deficits include but are not limited to inability to use RUE for adls and iadls. Signs  and symptoms are consistent with diagnosis of Spasticity as late effect of cerebrovascular accident. Pt and OT discussed evaluation findings, pathology, POC and HEP.  Pt voiced understanding and performs HEP correctly without reported pain. Skilled Occupational Therapy is medically necessary to address the above impairments and reach functional goals.    OBJECTIVE   Observation: Ambulating with cane.     Cognition: Overall Cognitive Status:  WFL - within functional limits    Vision: Current Vision: no visual deficits noted      Sensory: WNL    Spasticity: Mild flexor tone      Orthotics: none    Edema: none    ROM: WNL KATELYN UE except below  Shoulder  Elbow Wrist Hand   Flexion: R 40  Abduction: R 45; L  Flexion: R 0  Extension: R 0  Supination: R 0  Pronation: R 0 Flexion: R 0  Extension: R 0  Ulnar Deviation: R 0  Radial Deviation R 0 MP: R 0  PIP: R 0  DIP: R 0     STRENGTH/MMT: 5/5 KATELYN UE except below:  Shoulder Elbow Wrist   Flexion: R 2+/5; L 5/5  Abduction: R 2+/5; L 5/5  ER: R 0/5; L 5/5  IR: R 0/5; L 5/5 Flexion: R 0/5  Extension: R 0/5  Supination: R 0/5  Pronation: R 0/5 Flexion: R 0/0, L 5/5  Extension: R 0/0, L 5/5  Ulnar Deviation: R 0/0, L 5/5  Radial Deviation R 0/0, L 5/5     Strength (lbs) Right Average Left Average   : unable 39         Today's Treatment and Response:   Pt education was provided on exam findings, treatment diagnosis, treatment plan, expectations, and prognosis.  Patient was instructed in and issued a HEP for: Towel slides vertically, horizontally, circles    Charges: OT Eval Moderate Complexity, TE 1      Total Timed Treatment: 15 min     Total Treatment Time: 45 min     Based on clinical rationale and outcome measures, this evaluation involved Moderate Complexity decision making due to 1-2 personal factors/comorbidities, 4+ body structures involved/activity limitations, and evolving symptoms including  decline in functional independence .  PLAN OF CARE:    Goals: (to be met in  12 visits)  Pt will be independent and compliant with comprehensive HEP to maintain progress achieved in OT  Pt will increase their (R) shoulder flexion and abduction to at least 90 degrees for ease of dressing  Pt will increase their (R) shoulder flexion and abduction to at least 3+/5  for ease of transfers.   Pt will decrease their QuickDASH score by 10% in order to demo improvements in functional independence.       Frequency / Duration: Patient will be seen for  1-2x/week or a total of 12 visits over a 90 day period.  Treatment will include: Neuromuscular Re-education, Manual Therapy, Therapeutic Exercise, Therapeutic Activity, Electrical Stim, Splinting, Pt education, Home exercise program    Education or treatment limitation: Communication,  interpretered per wife's request  Rehab Potential:good    QuickDASH Outcome Score  Score: (Patient-Rptd) 61.36 % (12/9/2024 12:39 PM)    Patient/Family/Caregiver was advised of these findings, precautions, and treatment options and has agreed to actively participate in planning and for this course of care.    Thank you for your referral. Please co-sign or sign and return this letter via fax as soon as possible to [phone]. If you have any questions, please contact me at Dept: [phone]    Sincerely,  Electronically signed by therapist: Nancy Butts, OT  Physician's certification required: Yes  I certify the need for these services furnished under this plan of treatment and while under my care.    X___________________________________________________ Date____________________    Certification From: 12/6/2024  To:3/6/2025

## 2024-12-09 ENCOUNTER — OFFICE VISIT (OUTPATIENT)
Dept: PHYSICAL THERAPY | Facility: HOSPITAL | Age: 69
End: 2024-12-09
Attending: Other
Payer: MEDICARE

## 2024-12-09 ENCOUNTER — OFFICE VISIT (OUTPATIENT)
Dept: OCCUPATIONAL MEDICINE | Facility: HOSPITAL | Age: 69
End: 2024-12-09
Attending: Other
Payer: MEDICARE

## 2024-12-09 DIAGNOSIS — R25.2 SPASTICITY AS LATE EFFECT OF CEREBROVASCULAR ACCIDENT (CVA): Primary | ICD-10-CM

## 2024-12-09 DIAGNOSIS — I69.398 SPASTICITY AS LATE EFFECT OF CEREBROVASCULAR ACCIDENT (CVA): Primary | ICD-10-CM

## 2024-12-09 PROCEDURE — 97110 THERAPEUTIC EXERCISES: CPT

## 2024-12-09 PROCEDURE — 97166 OT EVAL MOD COMPLEX 45 MIN: CPT

## 2024-12-09 NOTE — PROGRESS NOTES
Diagnosis: L hemiparesis, difficulty walking, hx of stroke     Visit (# authorized):   10 per POC (Medicare)                       Referring Provider: Mimi    Precautions: at risk of falls     Pain: 0/10    Subjective: No new information to report.     Objective:    Date  12/3 12/5 12/9        Visit Number  2 3 4        NMR  x         Ther EX x x x        Ther Act           Gait Training           CRM            Manual             Additional Treatment Information:    Therapeutic Exercise (40 mins):  Nu-step level 3 BLE only x10 mins -belt around thighs  Shuttle    B press (blue, yellow) x20- tactile and VC's for R knee control into EXT   SL press R only (blue, yellow) 2x15 R only, tactile and VC's for R knee control into EXT   Hip flexion focus on R stance stability with L movement 2x10 B  Sit<>stand: L foot on 2 in block to promote wt bearing R- no UE support- tactile facilitation through RLE to increase wt bearing- tactile cues to increase wt shift R, 5 reps.       Ambulation multiple bouts in session with SBQC, various distances -VC's for increased symmetry of steps       Patient Education:  Encouraged continued performance of HEP.       Assessment:   Tolerated session well overall. Exhibits some self limiting behaviors with ambulation.     Plan: in future sessions: continue shuttle, TDM training, continue sit<>stand training     Charges:  3 TE      Total Timed Treatment: 40 min  Total Treatment Time: 40 min      21st Century Cures Act Notice to Patient: Medical documents like this are made available to patients in the interest of transparency. However, be advised this is a medical document and it is intended as cwkx-nh-ldue communication between your medical providers. This medical document may contain abbreviations, assessments, medical data, and results or other terms that are unfamiliar. Medical documents are intended to carry relevant information, facts as evident, and the clinical opinion of the  practitioner. As such, this medical document may be written in language that appears blunt or direct. You are encouraged to contact your medical provider and/or [facility] Patient Experience if you have any questions about this medical document.

## 2024-12-10 ENCOUNTER — ORDER TRANSCRIPTION (OUTPATIENT)
Dept: PHYSICAL THERAPY | Facility: HOSPITAL | Age: 69
End: 2024-12-10

## 2024-12-11 ENCOUNTER — OFFICE VISIT (OUTPATIENT)
Dept: OCCUPATIONAL MEDICINE | Facility: HOSPITAL | Age: 69
End: 2024-12-11
Attending: Other
Payer: MEDICARE

## 2024-12-11 ENCOUNTER — OFFICE VISIT (OUTPATIENT)
Dept: PHYSICAL THERAPY | Facility: HOSPITAL | Age: 69
End: 2024-12-11
Attending: Other
Payer: MEDICARE

## 2024-12-11 PROCEDURE — 97116 GAIT TRAINING THERAPY: CPT

## 2024-12-11 PROCEDURE — 97112 NEUROMUSCULAR REEDUCATION: CPT

## 2024-12-11 NOTE — PROGRESS NOTES
Diagnosis: L hemiparesis, difficulty walking, hx of stroke     Visit (# authorized):   10 per POC (Medicare)                       Referring Provider: Mimi    Precautions: at risk of falls     Pain: 0/10    Subjective: No new information to report.     Objective:    Date  12/3 12/5 12/9 12/11       Visit Number  2 3 4 5       NMR  x         Ther EX x x x        Ther Act           Gait Training    x       CRM            Manual             Additional Treatment Information:    Gait training (43 mins):   TDM training with harness no BWS -VC's for increased symmetry of steps    - 0.6-0.7mph 6 mins    -0.4 mph 2# wt attempted on LLE- significant decline in quality of gait- deferred   - 0.4-0.5 facilitation of longer step L -significant IR of R LE during swing -unable to correct despite cues -deferred   -0.5-0.7mph Red TB around R foot to reduce IR -facilitation of longer step L initially-->slowly reducing facilitation L, 6 mins   -0.6 mph without facilitation or assist  -intermittent cues for increased B step length, x6 mins  *seated rest after ea bout of ambulation   OG ambulation with quad cane ~100 ft -intermittent VC's for increased B step length      Patient Education:  Encouraged continued performance of HEP.       Assessment:   Tolerated session well overall. Exhibits some self limiting behaviors with ambulation. Improved quality of gait with increased time ambulating. Fair to good carryover from treadmill to OG ambulation.     Plan: in future sessions: continue shuttle, continue TDM training, continue sit<>stand training     Charges:  3 GT     Total Timed Treatment: 43 min  Total Treatment Time: 43 min      21st Century Cures Act Notice to Patient: Medical documents like this are made available to patients in the interest of transparency. However, be advised this is a medical document and it is intended as vqbd-qd-drqo communication between your medical providers. This medical document may contain abbreviations,  assessments, medical data, and results or other terms that are unfamiliar. Medical documents are intended to carry relevant information, facts as evident, and the clinical opinion of the practitioner. As such, this medical document may be written in language that appears blunt or direct. You are encouraged to contact your medical provider and/or [facility] Patient Experience if you have any questions about this medical document.

## 2024-12-11 NOTE — PROGRESS NOTES
Diagnosis:   Spasticity as late effect of cerebrovascular accident (CVA) (I69.398,R25.2)       Referring Provider: Issa Le  Date of Evaluation:    12/6/2024    Precautions:  Hx of CVA, HTN Next MD visit:   none scheduled  Date of Surgery: n/a   Insurance Primary/Secondary: BCBS OUT OF STATE / N/A     # Auth Visits: no auth            Subjective: Pt states forgetting handout from last session.    Pain: 0/10 not being seen for pain      Objective: See exercises flowsheet below for exercises and activities performed today. All exercises performed bilaterally.      Assessment: Pt able to reach higher range while in supine. Pt unable to perform AROM in external rotation of her RUE, therefore PROM in external rotation performed and AROM in internal rotation.      Goals: (to be met in 12 visits)  Pt will be independent and compliant with comprehensive HEP to maintain progress achieved in OT  Pt will increase their (R) shoulder flexion and abduction to at least 90 degrees for ease of dressing  Pt will increase their (R) shoulder flexion and abduction to at least 3+/5  for ease of transfers.   Pt will decrease their QuickDASH score by 10% in order to demo improvements in functional independence.     Plan: follow up on towel slides  Date 12/11/2024                 Visit # 2/12                                    Evaluation                 Manual                                                 Ther ex                                                   HEP instruction                    Therapeutic Activity                                                 Neuromuscular Re-education                               Cane ex: 10x2  Seated elbow extension, shoulder abduction 10x2    Supine BUE   -shoulder flex  -internal/external rotation  10x3                   Modalities                                                HEP:  Assignment date:   Towel slides 12/06/24                Charges: NMRE 3       Total Timed Treatment: 40  min  Total Treatment Time: 40 min

## 2024-12-16 ENCOUNTER — OFFICE VISIT (OUTPATIENT)
Dept: OCCUPATIONAL MEDICINE | Facility: HOSPITAL | Age: 69
End: 2024-12-16
Attending: Other
Payer: MEDICARE

## 2024-12-16 ENCOUNTER — OFFICE VISIT (OUTPATIENT)
Dept: PHYSICAL THERAPY | Facility: HOSPITAL | Age: 69
End: 2024-12-16
Attending: Other
Payer: MEDICARE

## 2024-12-16 PROCEDURE — 97110 THERAPEUTIC EXERCISES: CPT

## 2024-12-16 PROCEDURE — 97112 NEUROMUSCULAR REEDUCATION: CPT

## 2024-12-16 NOTE — PROGRESS NOTES
Diagnosis: L hemiparesis, difficulty walking, hx of stroke     Visit (# authorized):   10 per POC (Medicare)                       Referring Provider: Mimi    Precautions: at risk of falls     Pain: 0/10    Subjective: Had AFO adjusted, walking feels more \"natural\" now.     Objective:    Date  12/3 12/5 12/9 12/11 12/16      Visit Number  2 3 4 5 6      NMR  x         Ther EX x x x  x      Ther Act           Gait Training    x       CRM            Manual             Additional Treatment Information:    Therapeutic Exercise (40 mins):  Nu-step level 4 BLE only x10 mins   Seated R heel slides: AA for <50% of ROM into knee flexion x20 reps R only     Shuttle    B press (2 blue) x20- tactile and VC's for R knee control into EXT   SL press R only (blue, silver) 2x15 R only, VC's for R knee control into EXT   Hip flexion focus on R stance stability with L movement 2x10 B      Ambulation multiple bouts in session with SBQC, various distances -VC's for increased symmetry of steps       Patient Education:  Encouraged continued performance of HEP.       Assessment:   Tolerated session well overall despite fatigue of leg with activity.      Plan: in future sessions: continue shuttle, continue TDM training, continue sit<>stand training     Charges:  3 TE     Total Timed Treatment: 40 min  Total Treatment Time: 40 min      21st Century Cures Act Notice to Patient: Medical documents like this are made available to patients in the interest of transparency. However, be advised this is a medical document and it is intended as gtkf-vo-qruu communication between your medical providers. This medical document may contain abbreviations, assessments, medical data, and results or other terms that are unfamiliar. Medical documents are intended to carry relevant information, facts as evident, and the clinical opinion of the practitioner. As such, this medical document may be written in language that appears blunt or direct. You are  encouraged to contact your medical provider and/or [facility] Patient Experience if you have any questions about this medical document.

## 2024-12-18 ENCOUNTER — OFFICE VISIT (OUTPATIENT)
Dept: OCCUPATIONAL MEDICINE | Facility: HOSPITAL | Age: 69
End: 2024-12-18
Attending: Other
Payer: MEDICARE

## 2024-12-18 ENCOUNTER — OFFICE VISIT (OUTPATIENT)
Dept: PHYSICAL THERAPY | Facility: HOSPITAL | Age: 69
End: 2024-12-18
Attending: Other
Payer: MEDICARE

## 2024-12-18 PROCEDURE — 97112 NEUROMUSCULAR REEDUCATION: CPT

## 2024-12-18 PROCEDURE — 97116 GAIT TRAINING THERAPY: CPT

## 2024-12-18 PROCEDURE — 97110 THERAPEUTIC EXERCISES: CPT

## 2024-12-18 NOTE — PROGRESS NOTES
Diagnosis:   Spasticity as late effect of cerebrovascular accident (CVA) (I69.398,R25.2)       Referring Provider: Issa Le  Date of Evaluation:    12/6/2024    Precautions:  Hx of CVA, HTN Next MD visit:   none scheduled  Date of Surgery: n/a   Insurance Primary/Secondary: BCBS OUT OF STATE / N/A     # Auth Visits: no auth            Subjective: Pt states pain at end range R shoulder flexion.    Pain: 0/10 not being seen for pain      Objective: See exercises flowsheet below for exercises and activities performed today. All exercises performed bilaterally.      Assessment: Pt continues to benefit from supine exercises for deeper end range. Minimal active shoulder flexion and abduction at this time.     Goals: (to be met in 12 visits)  Pt will be independent and compliant with comprehensive HEP to maintain progress achieved in OT  Pt will increase their (R) shoulder flexion and abduction to at least 90 degrees for ease of dressing  Pt will increase their (R) shoulder flexion and abduction to at least 3+/5  for ease of transfers.   Pt will decrease their QuickDASH score by 10% in order to demo improvements in functional independence.     Plan: Assign supine exercises for home  Date 12/11/2024 12/16/2024 12/18/2024             Visit # 2/12  3/12  4/12                                Evaluation                 Manual                                                 Ther ex                                   Towel slides  -flex/extend shoulder  -circles    Scapular elevation 20x1    Scapular retraction  20x1    AAROM elbow flex/extend 10x3 Scapular elevation 20x1    Scapular retraction  20x1    AAROM Elbow extend/flexion 10x3    AAROM shoulder abd/add 10x3             HEP instruction                    Therapeutic Activity                                                 Neuromuscular Re-education                               Cane ex: 10x2  Seated elbow extension, shoulder abduction 10x2    Supine BUE   -shoulder  flex  -internal/external rotation  10x3   Supine BUE   -shoulder flex  -internal/external rotation  10x3    PNF pattern reaching to cane 10x3   Supine BUE   -shoulder flex  -internal/external rotation  10x3    1# cane with universal cuff  -elbow flexion  -shoulder abduction                 Modalities                                                HEP:  Assignment date:   Towel slides 12/06/24                Charges: NMRE 2 (25), TE 1 (15)        Total Timed Treatment: 40 min  Total Treatment Time: 40 min

## 2024-12-18 NOTE — PROGRESS NOTES
Diagnosis: L hemiparesis, difficulty walking, hx of stroke     Visit (# authorized):   10 per POC (Medicare)                       Referring Provider: Mimi    Precautions: at risk of falls     Pain: 0/10    Subjective: No new information to report.      Objective:    Date  12/3 12/5 12/9 12/11 12/16 12/18     Visit Number  2 3 4 5 6 7     NMR  x         Ther EX x x x  x      Ther Act           Gait Training    x  x     CRM            Manual             Additional Treatment Information:    Gait training (40 mins):   TDM training with harness no BWS -VC's for increased symmetry of steps    - 0.6-0.7mph 6 mins    0.6-0.9mph 6 mins - 1/4\" lift added to L shoe 8 mins    -0.5 mph with obstacle negotiation 4.5 mins  *seated rest after ea bout of ambulation   OG ambulation with quad cane ~100 ft -intermittent VC's for increased B step length    Patient Education:  Encouraged continued performance of HEP.       Assessment:   Pt with improvement in R foot clearance during swing after addition of L heel lift. Pt sustained LOB during last bout of ambulation on TDM but caught by harness system. Pt uninjured and denied pain after incident. Able to ambulation without alteration of gait from baseline. Symmetry of gait overall improved compared to last session. Progressing well.     Plan: in future sessions: continue shuttle, continue TDM training, continue sit<>stand training     Charges:  3 GT    Total Timed Treatment: 40 min  Total Treatment Time: 40 min      21st Century Cures Act Notice to Patient: Medical documents like this are made available to patients in the interest of transparency. However, be advised this is a medical document and it is intended as tnij-xz-ytea communication between your medical providers. This medical document may contain abbreviations, assessments, medical data, and results or other terms that are unfamiliar. Medical documents are intended to carry relevant information, facts as evident, and the  clinical opinion of the practitioner. As such, this medical document may be written in language that appears blunt or direct. You are encouraged to contact your medical provider and/or [facility] Patient Experience if you have any questions about this medical document.

## 2024-12-23 ENCOUNTER — OFFICE VISIT (OUTPATIENT)
Dept: OCCUPATIONAL MEDICINE | Facility: HOSPITAL | Age: 69
End: 2024-12-23
Attending: Other
Payer: MEDICARE

## 2024-12-23 ENCOUNTER — OFFICE VISIT (OUTPATIENT)
Dept: PHYSICAL THERAPY | Facility: HOSPITAL | Age: 69
End: 2024-12-23
Attending: Other
Payer: MEDICARE

## 2024-12-23 PROCEDURE — 97116 GAIT TRAINING THERAPY: CPT

## 2024-12-23 PROCEDURE — 97112 NEUROMUSCULAR REEDUCATION: CPT

## 2024-12-23 PROCEDURE — 97110 THERAPEUTIC EXERCISES: CPT

## 2024-12-23 NOTE — PROGRESS NOTES
Diagnosis:   Spasticity as late effect of cerebrovascular accident (CVA) (I69.398,R25.2)       Referring Provider: Issa Le  Date of Evaluation:    12/6/2024    Precautions:  Hx of CVA, HTN Next MD visit:   none scheduled  Date of Surgery: n/a   Insurance Primary/Secondary: BCBS OUT OF STATE / N/A     # Auth Visits: no auth            Subjective: Pt states the right arm being so stiff because of this weather.    Pain: 0/10 not being seen for pain      Objective: See exercises flowsheet below for exercises and activities performed today. All exercises performed bilaterally.      Assessment: Pt demo'd greater control in R shoulder adduction and internal rotation.     Goals: (to be met in 12 visits)  Pt will be independent and compliant with comprehensive HEP to maintain progress achieved in OT  Pt will increase their (R) shoulder flexion and abduction to at least 90 degrees for ease of dressing  Pt will increase their (R) shoulder flexion and abduction to at least 3+/5  for ease of transfers.   Pt will decrease their QuickDASH score by 10% in order to demo improvements in functional independence.     Plan: Assign supine exercises for home next session  Date 12/11/2024 12/16/2024 12/18/2024 12/23/2024            Visit # 2/12  3/12  4/12  5/12                              Evaluation                 Manual                                                 Ther ex                                   Towel slides  -flex/extend shoulder  -circles    Scapular elevation 20x1    Scapular retraction  20x1    AAROM elbow flex/extend 10x3 Scapular elevation 20x1    Scapular retraction  20x1    AAROM Elbow extend/flexion 10x3    AAROM shoulder abd/add 10x3 Scapular elevation 20x1    Scapular retraction  20x1    Towel slides  -flex/extend shoulder  -circles  10x2           HEP instruction                    Therapeutic Activity                                                 Neuromuscular Re-education                                Cane ex: 10x2  Seated elbow extension, shoulder abduction 10x2    Supine BUE   -shoulder flex  -internal/external rotation  10x3   Supine BUE   -shoulder flex  -internal/external rotation  10x3    PNF pattern reaching to cane 10x3   Supine BUE   -shoulder flex  -internal/external rotation  10x3    1# cane with universal cuff  -elbow flexion  -shoulder abduction      -AAROM shoulder flexion/extension 10x2    Seated 1# cane with universal cuff  -elbow flexion  -shoulder abduction           Modalities                                                HEP:  Assignment date:   Riccardo hester 12/06/24                Charges: NMRE 2 (25), TE 1 (15)        Total Timed Treatment: 40 min  Total Treatment Time: 40 min

## 2024-12-23 NOTE — PROGRESS NOTES
Diagnosis: L hemiparesis, difficulty walking, hx of stroke     Visit (# authorized):   10 per POC (Medicare)                       Referring Provider: Mimi    Precautions: at risk of falls     Pain: 0/10    Subjective: No new information to report.      Objective:    Date  12/3 12/5 12/9 12/11 12/16 12/18 12/23    Visit Number  2 3 4 5 6 7 8    NMR  x         Ther EX x x x  x      Ther Act           Gait Training    x  x x    CRM            Manual             Additional Treatment Information:    Gait training (40 mins):   TDM training with harness no BWS -laser for visual cue, music for gulshan    - 0.6-0.7mph 10 mins    -backwards walking attempted- demo with return demo, unable to keep up with belt of TDM - 2 attempts then deferred   *seated rest after ea bout of ambulation   SBQC backwards walking - 1 person providing CGA-MIN A for balance - 1 person providing facilitation at R medial hamstring and DF during step ~25 ft x2 bouts   OG ambulation with quad cane ~140 ft -intermittent VC's for increased B step length  Turn training including through/around cones and turn to sit VC's for sequence and symmetry of steps     Patient Education:  Encouraged continued performance of HEP.       Assessment:   Tolerated session well without adverse response. Improving but inconsistent gait pattern. Challenged by backwards stepping improved with facilitation.     Plan: in future sessions: continue shuttle, continue TDM training, continue sit<>stand training. Continue backwards walking     Charges:  3 GT    Total Timed Treatment: 40 min  Total Treatment Time: 40 min      21st Century Cures Act Notice to Patient: Medical documents like this are made available to patients in the interest of transparency. However, be advised this is a medical document and it is intended as zygg-ij-qfxo communication between your medical providers. This medical document may contain abbreviations, assessments, medical data, and results or other  terms that are unfamiliar. Medical documents are intended to carry relevant information, facts as evident, and the clinical opinion of the practitioner. As such, this medical document may be written in language that appears blunt or direct. You are encouraged to contact your medical provider and/or [facility] Patient Experience if you have any questions about this medical document.

## 2024-12-30 ENCOUNTER — OFFICE VISIT (OUTPATIENT)
Dept: PHYSICAL THERAPY | Facility: HOSPITAL | Age: 69
End: 2024-12-30
Attending: Other
Payer: MEDICARE

## 2024-12-30 ENCOUNTER — OFFICE VISIT (OUTPATIENT)
Dept: OCCUPATIONAL MEDICINE | Facility: HOSPITAL | Age: 69
End: 2024-12-30
Attending: Other
Payer: MEDICARE

## 2024-12-30 PROCEDURE — 97110 THERAPEUTIC EXERCISES: CPT

## 2024-12-30 PROCEDURE — 97116 GAIT TRAINING THERAPY: CPT

## 2024-12-30 PROCEDURE — 97112 NEUROMUSCULAR REEDUCATION: CPT

## 2024-12-30 NOTE — PROGRESS NOTES
Diagnosis: L hemiparesis, difficulty walking, hx of stroke     Visit (# authorized):   10 per POC (Medicare)                       Referring Provider: Mimi    Precautions: at risk of falls     Pain: 0/10    Subjective: No new information to report.      Objective:    Date  12/3 12/5 12/9 12/11 12/16 12/18 12/23 12/30   Visit Number  2 3 4 5 6 7 8 9   NMR  x         Ther EX x x x  x      Ther Act           Gait Training    x  x x x   CRM            Manual             Additional Treatment Information:    Gait training (39 mins):   TDM training with harness no BWS -laser for visual cue, music for gulshan    - 0.7-0.8 mph 2x8 mins   *seated rest after ea bout of ambulation   SBQC backwards walking - 1 person providing CGA-MIN A for balance - 1 person providing facilitation at R medial hamstring and DF during step ~ 30ft x2 bouts     Sit<>Stand focus on feet even for improved symmetry of wt bearing     Patient Education:  Encouraged continued performance of HEP.  POC discussed with pt and .       Assessment:   Tolerated session well without adverse response- though fatigued at end of session.      Plan: update HEP as appropriate    Charges:  3 GT    Total Timed Treatment: 39 min  Total Treatment Time: 41 min      21st Century Cures Act Notice to Patient: Medical documents like this are made available to patients in the interest of transparency. However, be advised this is a medical document and it is intended as lsev-qu-aqoq communication between your medical providers. This medical document may contain abbreviations, assessments, medical data, and results or other terms that are unfamiliar. Medical documents are intended to carry relevant information, facts as evident, and the clinical opinion of the practitioner. As such, this medical document may be written in language that appears blunt or direct. You are encouraged to contact your medical provider and/or [facility] Patient Experience if you have  any questions about this medical document.

## 2024-12-30 NOTE — PROGRESS NOTES
Diagnosis:   Spasticity as late effect of cerebrovascular accident (CVA) (I69.398,R25.2)       Referring Provider: Issa Le  Date of Evaluation:    12/6/2024    Precautions:  Hx of CVA, HTN Next MD visit:   none scheduled  Date of Surgery: n/a   Insurance Primary/Secondary: BCBS OUT OF STATE / N/A     # Auth Visits: no auth            Subjective: Pt states plan to get another round of botox injections to her R arm on the 28th.     Pain: 0/10 not being seen for pain      Objective: See exercises flowsheet below for exercises and activities performed today. All exercises performed bilaterally.      Assessment: Supine shoulder flexion added to HEP. Pt demo'd ability to flex and extend her elbow against gravity today while in supine. External rotation of R shoulder remains difficult.    Goals: (to be met in 12 visits)  Pt will be independent and compliant with comprehensive HEP to maintain progress achieved in OT  Pt will increase their (R) shoulder flexion and abduction to at least 90 degrees for ease of dressing  Pt will increase their (R) shoulder flexion and abduction to at least 3+/5  for ease of transfers.   Pt will decrease their QuickDASH score by 10% in order to demo improvements in functional independence.     Plan: follow up on new HEP  Date 12/11/2024 12/16/2024 12/18/2024 12/23/2024 12/30/2024         Visit # 2/12  3/12  4/12  5/12  6/12                            Evaluation                 Manual                                                 Ther ex                                   Towel slides  -flex/extend shoulder  -circles    Scapular elevation 20x1    Scapular retraction  20x1    AAROM elbow flex/extend 10x3 Scapular elevation 20x1    Scapular retraction  20x1    AAROM Elbow extend/flexion 10x3    AAROM shoulder abd/add 10x3 Scapular elevation 20x1    Scapular retraction  20x1    Towel slides  -flex/extend shoulder  -circles  10x2 Scapular elevation 10x3    Scapular  retraction  10x3    Shoulder circles 10x2         HEP instruction                    Therapeutic Activity                                                 Neuromuscular Re-education                               Cane ex: 10x2  Seated elbow extension, shoulder abduction 10x2    Supine BUE   -shoulder flex  -internal/external rotation  10x3   Supine BUE   -shoulder flex  -internal/external rotation  10x3    PNF pattern reaching to cane 10x3   Supine BUE   -shoulder flex  -internal/external rotation  10x3    1# cane with universal cuff  -elbow flexion  -shoulder abduction      -AAROM shoulder flexion/extension 10x2    Seated 1# cane with universal cuff  -elbow flexion  -shoulder abduction Supine AAROM  -shoulder flex  -internal/external rotation  -elbow flex/extend  -wrist flex/extend  10x3         Modalities                                                HEP:  Assignment date:   Towel slides 12/06/24   Supine shoulder AAROM 12/30/2024           Access Code: [email or web address]  URL: [email or web address]  Date: 12/30/2024  Prepared by: Nancy URIBE MS, OTR/L    Exercises  - Supine Shoulder Flexion AAROM with Hands Clasped  - 2 x daily - 7 x weekly - 1 sets - 20 reps    Charges: NMRE 2 (30), TE 1 (10)        Total Timed Treatment: 40 min  Total Treatment Time: 40 min

## 2025-01-06 ENCOUNTER — APPOINTMENT (OUTPATIENT)
Dept: PHYSICAL THERAPY | Facility: HOSPITAL | Age: 70
End: 2025-01-06
Attending: Other
Payer: MEDICARE

## 2025-01-06 ENCOUNTER — APPOINTMENT (OUTPATIENT)
Dept: OCCUPATIONAL MEDICINE | Facility: HOSPITAL | Age: 70
End: 2025-01-06
Attending: Other
Payer: MEDICARE

## 2025-01-08 ENCOUNTER — OFFICE VISIT (OUTPATIENT)
Dept: PHYSICAL THERAPY | Facility: HOSPITAL | Age: 70
End: 2025-01-08
Attending: Other
Payer: MEDICARE

## 2025-01-08 ENCOUNTER — OFFICE VISIT (OUTPATIENT)
Dept: OCCUPATIONAL MEDICINE | Facility: HOSPITAL | Age: 70
End: 2025-01-08
Attending: Other
Payer: MEDICARE

## 2025-01-08 PROCEDURE — 97110 THERAPEUTIC EXERCISES: CPT

## 2025-01-08 PROCEDURE — 97112 NEUROMUSCULAR REEDUCATION: CPT

## 2025-01-08 NOTE — PROGRESS NOTES
Diagnosis:   Spasticity as late effect of cerebrovascular accident (CVA) (I69.398,R25.2)       Referring Provider: Issa Le  Date of Evaluation:    12/6/2024    Precautions:  Hx of CVA, HTN Next MD visit:   none scheduled  Date of Surgery: n/a   Insurance Primary/Secondary: BCBS OUT OF STATE / N/A     # Auth Visits: no auth            Subjective: Pt states having difficulty with her HEP. Pain at end range. Pt will be receiving botox on 1/28/25.    Pain: 0/10 not being seen for pain      Objective: See exercises flowsheet below for exercises and activities performed today. All exercises performed bilaterally.      Assessment: Majority of pt's session included a trial of the arm bike as pt and  are interested in purchasing for home. Pt does not have the  strength or hand functioning to hold on to hand rail with the R hand, therefore pt needs universal cuff to perform correctly. Pt trialled both nustep and standard arm bike. Pt benefited from stationary handle for success. Resources provided for purchase.     Goals: (to be met in 12 visits)  Pt will be independent and compliant with comprehensive HEP to maintain progress achieved in OT  Pt will increase their (R) shoulder flexion and abduction to at least 90 degrees for ease of dressing  Pt will increase their (R) shoulder flexion and abduction to at least 3+/5  for ease of transfers.   Pt will decrease their QuickDASH score by 10% in order to demo improvements in functional independence.     Plan: follow up on arm bike purchase  Date 12/11/2024 12/16/2024 12/18/2024 12/23/2024 12/30/2024 1/8/2025       Visit # 2/12  3/12  4/12  5/12  6/12  7/12                          Evaluation                 Manual                                                 Ther ex                                   Towel slides  -flex/extend shoulder  -circles    Scapular elevation 20x1    Scapular retraction  20x1    AAROM elbow flex/extend 10x3 Scapular elevation  20x1    Scapular retraction  20x1    AAROM Elbow extend/flexion 10x3    AAROM shoulder abd/add 10x3 Scapular elevation 20x1    Scapular retraction  20x1    Towel slides  -flex/extend shoulder  -circles  10x2 Scapular elevation 10x3    Scapular retraction  10x3    Shoulder circles 10x2 Scapular elevation 10x3    Scapular retraction  10x3    AAROM elbow flex/extend 10x3    Arm bike trial  with cuff(25 mins)       HEP instruction                    Therapeutic Activity                                                 Neuromuscular Re-education                               Cane ex: 10x2  Seated elbow extension, shoulder abduction 10x2    Supine BUE   -shoulder flex  -internal/external rotation  10x3   Supine BUE   -shoulder flex  -internal/external rotation  10x3    PNF pattern reaching to cane 10x3   Supine BUE   -shoulder flex  -internal/external rotation  10x3    1# cane with universal cuff  -elbow flexion  -shoulder abduction      -AAROM shoulder flexion/extension 10x2    Seated 1# cane with universal cuff  -elbow flexion  -shoulder abduction Supine AAROM  -shoulder flex  -internal/external rotation  -elbow flex/extend  -wrist flex/extend  10x3         Modalities                                                HEP:  Assignment date:   Towel slides 12/06/24   Supine shoulder AAROM 12/30/2024           Access Code: [email or web address]  URL: [email or web address]  Date: 12/30/2024  Prepared by: Nancy URIBE MS, OTR/L    Exercises  - Supine Shoulder Flexion AAROM with Hands Clasped  - 2 x daily - 7 x weekly - 1 sets - 20 reps    Charges: TE 3       Total Timed Treatment: 43 min  Total Treatment Time: 43 min

## 2025-01-08 NOTE — PROGRESS NOTES
Progress Summary    Referring Provider: Mimi    Diagnosis: L hemiparesis, difficulty walking, hx of stroke        Pt has attended 10 visits in Physical Therapy.     Subjective: Pt and  both note improvements with PT. Remains compliant to HEP. Third round of Botox at the end of the month. She is motivated to continue PT to continue to progress her gait especially post- Botox injection.     Pain: 0/10    Assessment: Pt demonstrates global improvement in therapy. Significant improvement on BBS and TUG though she is still considered a fall risk based these measures. Symmetry of gait is improved but remains inconsistent. Strength on MMT is also improved as well as functional strength as evidenced by 5 times sit to stand test. Pt and  verbalize understanding of material taught today. Goals have been updated to reflect progress. She would benefit from continued PT intervention in order to further progress gait and balance to continue to progress level of independence and safety. Pt will also be undergoing Botox injections within the next few weeks further necessitating continuation of skilled PT intervention.       Objective:   *values from initial testing documented in parenthesis below     Strength:   Strength (-/5)    R L   Hip       Flexion 3 (3-) 4+ (4)   Knee       Flexion (Seated) 2 (2-) x     Extension 4+ (unchanged) x     Frank Balance Scale   Item Description Score(0 worst-4 best)    ___4___1. Sitting to standing  ___4___2. Standing unsupported   ___4___3. Sitting unsupported   ___4___4. Standing to sitting   ___3___5. Transfers   ___4___6. Standing with eyes closed   ___1___7. Standing with feet together -1 minute  ___3___8. Reaching forward with outstretched arm   ___3___9. Retrieving object from floor   ___3__10. Turning to look behind   ___1__11. Turning 360 degrees   ___0__12. Placing alternate foot on stool   ___2__13. Standing with one foot in front   ___1__14. Standing on one foot     Total  __37 (31)_/56 (less than 46/56 = fall risk)    5x Sit to Stand: 28.2 seconds (33 seconds)     Timed Up and Go (AD, time): 51.8 seconds (62.5 sec) SBQC   Fall Risk: yes    Pt and  educated on improvements in therapy and POC. Role of therapy post Botox injections explained. Discussed increasing frequency of cycling post Botox injections- rationale explained. Information for  glove and hand bike provided per pt  request     Nu-step BLE only level 4 x10 mins     Goals:   Goals to be met within POC or 90 days:  Pt to be independent in HEP ongoing   Pt will improve functional gait speed in order to perform TUG in 55 seconds or less. Met 1/8/25  Pt will improve functional strength in BLE in order to perform 5 times sit to  25 seconds or less. In progress, progress made  Pt will improve L hip flexor strength to at least 3+/5 in order allow for increased efficiency and safety of ambulation. In progress, progress made  New goal (1/8/25): Pt will improve quality and symmetry of gait in order to perform TUG in less than 45 seconds.       Charges: 2 TE, 1 NMR     Total timed treatment: 40 mins  Total treatment time: 40 mins      Rehab Potential: good    Plan: Continue skilled Physical Therapy 1 x/week or a total of 5 visits over a 90 day period. Treatment will include: Balance Training, Gait Training,  Therapeutic Exercises; Neuromuscular Re-education; Therapeutic Activity; Patient education; Home exercise program instruction.         Patient/Family/Caregiver was advised of these findings, precautions, and treatment options and has agreed to actively participate in planning and for this course of care.    Thank you for your referral. If you have any questions, please contact me at Dept: [phone].    Sincerely,    Kaylie Melgar PT, NCS    Electronically signed by therapist: Kaylie Melgar PT    Physician's certification required: Yes  Please co-sign or sign and return this letter via fax as soon  as possible to [phone]  I certify the need for these services furnished under this plan of treatment and while under my care.    X___________________________________________________ Date____________________    Certification From: 1/8/2025  To:4/8/2025 21st Century Cures Act Notice to Patient: Medical documents like this are made available to patients in the interest of transparency. However, be advised this is a medical document and it is intended as vizg-gv-xtop communication between your medical providers. This medical document may contain abbreviations, assessments, medical data, and results or other terms that are unfamiliar. Medical documents are intended to carry relevant information, facts as evident, and the clinical opinion of the practitioner. As such, this medical document may be written in language that appears blunt or direct. You are encouraged to contact your medical provider and/or [facility] Patient Experience if you have any questions about this medical document.

## 2025-01-13 ENCOUNTER — OFFICE VISIT (OUTPATIENT)
Dept: OCCUPATIONAL MEDICINE | Facility: HOSPITAL | Age: 70
End: 2025-01-13
Attending: Other
Payer: MEDICARE

## 2025-01-13 PROCEDURE — 97110 THERAPEUTIC EXERCISES: CPT

## 2025-01-13 PROCEDURE — 97112 NEUROMUSCULAR REEDUCATION: CPT

## 2025-01-13 NOTE — PROGRESS NOTES
Diagnosis:   Spasticity as late effect of cerebrovascular accident (CVA) (I69.398,R25.2)       Referring Provider: Issa Le  Date of Evaluation:    12/6/2024    Precautions:  Hx of CVA, HTN Next MD visit:   none scheduled  Date of Surgery: n/a   Insurance Primary/Secondary: BCBS OUT OF STATE / N/A     # Auth Visits: no auth            Subjective: Pt states getting an arm bike and trialing it.     Pain: 0/10 not being seen for pain      Objective: See exercises flowsheet below for exercises and activities performed today. All exercises performed bilaterally.      Assessment: Pt trained isometric shoulder girdle strengthening. Pt unable to perform adduction without compensating with elevation. Improved control in elbow AAROM, less assistance from OT.     Goals: (to be met in 12 visits)  Pt will be independent and compliant with comprehensive HEP to maintain progress achieved in OT  Pt will increase their (R) shoulder flexion and abduction to at least 90 degrees for ease of dressing  Pt will increase their (R) shoulder flexion and abduction to at least 3+/5  for ease of transfers.   Pt will decrease their QuickDASH score by 10% in order to demo improvements in functional independence.     Plan: continue with isometric strengthening.  Date 12/11/2024 12/16/2024 12/18/2024 12/23/2024 12/30/2024 1/8/2025 1/13/2025     Visit # 2/12  3/12  4/12  5/12  6/12  7/12  8/12                        Evaluation                 Manual                                                 Ther ex                                   Towel slides  -flex/extend shoulder  -circles    Scapular elevation 20x1    Scapular retraction  20x1    AAROM elbow flex/extend 10x3 Scapular elevation 20x1    Scapular retraction  20x1    AAROM Elbow extend/flexion 10x3    AAROM shoulder abd/add 10x3 Scapular elevation 20x1    Scapular retraction  20x1    Towel slides  -flex/extend shoulder  -circles  10x2 Scapular elevation 10x3    Scapular  retraction  10x3    Shoulder circles 10x2 Scapular elevation 10x3    Scapular retraction  10x3    AAROM elbow flex/extend 10x3    Arm bike trial  with cuff(25 mins) Scapular elevation 10x3    Scapular retraction  10x3     HEP instruction                    Therapeutic Activity                                                 Neuromuscular Re-education                               Cane ex: 10x2  Seated elbow extension, shoulder abduction 10x2    Supine BUE   -shoulder flex  -internal/external rotation  10x3   Supine BUE   -shoulder flex  -internal/external rotation  10x3    PNF pattern reaching to cane 10x3   Supine BUE   -shoulder flex  -internal/external rotation  10x3    1# cane with universal cuff  -elbow flexion  -shoulder abduction      -AAROM shoulder flexion/extension 10x2    Seated 1# cane with universal cuff  -elbow flexion  -shoulder abduction Supine AAROM  -shoulder flex  -internal/external rotation  -elbow flex/extend  -wrist flex/extend  10x3   Isometric shoulder extension 10x3, 5 sec holds    Isometric shoulder abduction 10x3, 5 sec holds    Supine AAROM  -shoulder flex/extend 10x2  -internal/external rotation  -elbow flex/extend  10x3       Modalities                                                HEP:  Assignment date:   Towel slides 12/06/24   Supine shoulder AAROM 12/30/2024           Access Code: [email or web address]  URL: [email or web address]  Date: 12/30/2024  Prepared by: Nancy URIBE MS, OTR/L    Exercises  - Supine Shoulder Flexion AAROM with Hands Clasped  - 2 x daily - 7 x weekly - 1 sets - 20 reps    Charges: NMRE 2 (30), TE 1 (10)      Total Timed Treatment: 40 min  Total Treatment Time: 40 min

## 2025-01-15 ENCOUNTER — OFFICE VISIT (OUTPATIENT)
Dept: OCCUPATIONAL MEDICINE | Facility: HOSPITAL | Age: 70
End: 2025-01-15
Attending: Other
Payer: MEDICARE

## 2025-01-15 PROCEDURE — 97112 NEUROMUSCULAR REEDUCATION: CPT

## 2025-01-15 PROCEDURE — 97110 THERAPEUTIC EXERCISES: CPT

## 2025-01-15 NOTE — PROGRESS NOTES
Diagnosis:   Spasticity as late effect of cerebrovascular accident (CVA) (I69.398,R25.2)       Referring Provider: Issa Le  Date of Evaluation:    12/6/2024    Precautions:  Hx of CVA, HTN Next MD visit:   none scheduled  Date of Surgery: n/a   Insurance Primary/Secondary: BCBS OUT OF STATE / N/A     # Auth Visits: no auth            Subjective: Pt states feeling stiffer today.    Pain: 0/10 not being seen for pain      Objective: See exercises flowsheet below for exercises and activities performed today. All exercises performed bilaterally.      Assessment: Pt better able to abduct and adduct her R shoulder today. Continued training in isometrics. Cues needed for correction of R shoulder elevation when working on abd/add.    Goals: (to be met in 12 visits)  Pt will be independent and compliant with comprehensive HEP to maintain progress achieved in OT  Pt will increase their (R) shoulder flexion and abduction to at least 90 degrees for ease of dressing  Pt will increase their (R) shoulder flexion and abduction to at least 3+/5  for ease of transfers.   Pt will decrease their QuickDASH score by 10% in order to demo improvements in functional independence.     Plan: continue with isometric strengthening.  Date 12/11/2024 12/16/2024 12/18/2024 12/23/2024 12/30/2024 1/8/2025 1/13/2025     Visit # 2/12  3/12  4/12  5/12  6/12  7/12  8/12                        Evaluation                 Manual                                                 Ther ex                                   Towel slides  -flex/extend shoulder  -circles    Scapular elevation 20x1    Scapular retraction  20x1    AAROM elbow flex/extend 10x3 Scapular elevation 20x1    Scapular retraction  20x1    AAROM Elbow extend/flexion 10x3    AAROM shoulder abd/add 10x3 Scapular elevation 20x1    Scapular retraction  20x1    Towel slides  -flex/extend shoulder  -circles  10x2 Scapular elevation 10x3    Scapular retraction  10x3    Shoulder circles  10x2 Scapular elevation 10x3    Scapular retraction  10x3    AAROM elbow flex/extend 10x3    Arm bike trial  with cuff(25 mins) Scapular elevation 10x3    Scapular retraction  10x3  AAROM elbow flex/extend 10x3    Scapular elevation 10x3     HEP instruction                    Therapeutic Activity                                                 Neuromuscular Re-education                               Cane ex: 10x2  Seated elbow extension, shoulder abduction 10x2    Supine BUE   -shoulder flex  -internal/external rotation  10x3   Supine BUE   -shoulder flex  -internal/external rotation  10x3    PNF pattern reaching to cane 10x3   Supine BUE   -shoulder flex  -internal/external rotation  10x3    1# cane with universal cuff  -elbow flexion  -shoulder abduction      -AAROM shoulder flexion/extension 10x2    Seated 1# cane with universal cuff  -elbow flexion  -shoulder abduction Supine AAROM  -shoulder flex  -internal/external rotation  -elbow flex/extend  -wrist flex/extend  10x3   Isometric shoulder extension 10x3, 5 sec holds    Isometric shoulder abduction 10x3, 5 sec holds    Supine AAROM  -shoulder flex/extend 10x2  -internal/external rotation  -elbow flex/extend  10x3   sometric shoulder extension 10x3, 5 sec holds    Isometric shoulder abduction 10x3, 5 sec holds    Supine AAROM 10x2  -shoulder flex  -shoulder adduction  -shoulder external/internal rotation   Modalities                                                HEP:  Assignment date:   Towel slides 12/06/24   Supine shoulder AAROM 12/30/2024           Access Code: [email or web address]  URL: [email or web address]  Date: 12/30/2024  Prepared by: Nancy URIBE MS, OTR/L    Exercises  - Supine Shoulder Flexion AAROM with Hands Clasped  - 2 x daily - 7 x weekly - 1 sets - 20 reps    Charges: NMRE 2 (30), TE 1 (9)      Total Timed Treatment: 39 min  Total Treatment Time: 39 min

## 2025-01-20 ENCOUNTER — APPOINTMENT (OUTPATIENT)
Dept: OCCUPATIONAL MEDICINE | Facility: HOSPITAL | Age: 70
End: 2025-01-20
Attending: Other
Payer: MEDICARE

## 2025-01-20 ENCOUNTER — TELEPHONE (OUTPATIENT)
Dept: OCCUPATIONAL MEDICINE | Facility: HOSPITAL | Age: 70
End: 2025-01-20

## 2025-01-22 ENCOUNTER — APPOINTMENT (OUTPATIENT)
Dept: OCCUPATIONAL MEDICINE | Facility: HOSPITAL | Age: 70
End: 2025-01-22
Attending: Other
Payer: MEDICARE

## 2025-01-22 ENCOUNTER — TELEPHONE (OUTPATIENT)
Dept: PHYSICAL THERAPY | Facility: HOSPITAL | Age: 70
End: 2025-01-22

## 2025-01-22 ENCOUNTER — APPOINTMENT (OUTPATIENT)
Dept: PHYSICAL THERAPY | Facility: HOSPITAL | Age: 70
End: 2025-01-22
Attending: Other
Payer: MEDICARE

## 2025-01-27 ENCOUNTER — APPOINTMENT (OUTPATIENT)
Dept: PHYSICAL THERAPY | Facility: HOSPITAL | Age: 70
End: 2025-01-27
Attending: Other
Payer: MEDICARE

## 2025-01-28 ENCOUNTER — APPOINTMENT (OUTPATIENT)
Dept: OCCUPATIONAL MEDICINE | Facility: HOSPITAL | Age: 70
End: 2025-01-28
Attending: Other
Payer: MEDICARE

## 2025-01-29 ENCOUNTER — OFFICE VISIT (OUTPATIENT)
Dept: PHYSICAL THERAPY | Facility: HOSPITAL | Age: 70
End: 2025-01-29
Attending: Other
Payer: MEDICARE

## 2025-01-29 PROCEDURE — 97110 THERAPEUTIC EXERCISES: CPT

## 2025-01-29 PROCEDURE — 97116 GAIT TRAINING THERAPY: CPT

## 2025-01-29 NOTE — PROGRESS NOTES
Diagnosis (evaluation date): L hemiparesis, difficulty walking, hx of stroke  (eval 11/18/24, PN 1/8/25)  POC: 15 visits   Insurance/# visits authorized: Medicare/no auth                         Referring Provider: Mimi    Visit #: 11  Today's Date: 1/29/2025    Precautions: at risk of falls     Pain: 0/10    Subjective: Missed last couple of sessions due to not feeling well and inclement weather. Had Botox injections yesterday.     Objective:   Gait training (15 mins):   TDM training with harness no BWS -laser for visual cue, music for gulshan    - 0.7 mph 10 mins -seated rest after bout    Therapeutic Exercise (24 mins):  Seated: RLE only - heel slides - AA into flexion x10   Seated: LE diagonals RLE only -demo with return demo, multiple reps- difficulty producing proper movement, deferred  Seated: hip ADD isometrics with towel roll- 5 sec hold x15   Seated R gastroc stretch with belt 2x1 min - rationale explained   Discussed prioritizing riding LE and UE bike at home post Botox -rationale explained       Patient Education:  HEP updated- see pt instructions. Additional education as above.       Assessment:   Improved quality and symmetry of gait today on treadmill. Pt verbalizes and demo's understanding of material taught today.       Plan: continue treadmill training     Charges: 1 GT, 2 TE       Total Timed Treatment: 39 min  Total Treatment Time: 39 min      21st Century Cures Act Notice to Patient: Medical documents like this are made available to patients in the interest of transparency. However, be advised this is a medical document and it is intended as fxpk-xh-lsns communication between your medical providers. This medical document may contain abbreviations, assessments, medical data, and results or other terms that are unfamiliar. Medical documents are intended to carry relevant information, facts as evident, and the clinical opinion of the practitioner. As such, this medical document may be written in  language that appears blunt or direct. You are encouraged to contact your medical provider and/or [facility] Patient Experience if you have any questions about this medical document.

## 2025-01-29 NOTE — PATIENT INSTRUCTIONS
Walk for at least 5 minutes 2-3 times a day for no other reason but to walk.     Ride bike for a total of 20 minutes per day (can break this up throughout day).     Do this stretch 3 times a day.     No shoes or brace: Prop right foot up on a chair with knee straight and toes pointed up towards ceiling. Use a belt around right foot to help pull toes up towards nose in order to increase stretch.     Do these exercises 2 times a day for strength    Sit up tall with feet on floor. Loop belt around thighs just above your knees. Separate knees pushing legs out into belt as hard as you can for 3-5 seconds before relaxing. Do this 15 times.     Sit up tall with feet on floor. March for 30 seconds. Rest for 30 seconds. Then repeat for an additional 30 seconds.     Sit in chair on tile floor with shoe and brace off. Feet flat on floor. Slide right foot forward, straightening your knee. Then slide right foot back, bending your knee. DO NOT USE YOUR OTHER LEG OR HANDS. Your  can help you bend the knee more but you should do as much as you can on your own- trying the whole time even when getting help. Do this 10 times.     Sit up tall. Feet on floor. Place a rolled up towel between knees and squeeze it between your knees for 5 seconds. Relax, then repeat. Do this 15 times.     Lie on your back. Bend right hip and knee this time moving your heel towards your bottom. Then slowly straighten. Try to keep your knee pointed up towards the ceiling as you move. Do this 20 times.     Do these exercises 1 time a day for balance     Stand with bed or couch behind you for safety (do not lean against it)  Interlace hands. Lift arms up without leaning body back.  Then lower arms. Do this 10 times.     Interlace hands. Hold arms out in front of you. Rotate body and head to the right then the left. Do this 10 times, move slow and controlled.     Stand up tall. Put feet close together so they are touching. Balance with eyes open and arms  at your sides for 1 minute.     Stand up tall. Feet normal distance apart. Balance with eyes closed and arms at your sides for 15 seconds.

## 2025-02-04 ENCOUNTER — OFFICE VISIT (OUTPATIENT)
Dept: PHYSICAL THERAPY | Facility: HOSPITAL | Age: 70
End: 2025-02-04
Attending: Other
Payer: MEDICARE

## 2025-02-04 PROCEDURE — 97116 GAIT TRAINING THERAPY: CPT

## 2025-02-04 NOTE — PROGRESS NOTES
Diagnosis (evaluation date): L hemiparesis, difficulty walking, hx of stroke  (eval 11/18/24, PN 1/8/25)  POC: 15 visits   Insurance/# visits authorized: Medicare/no auth                         Referring Provider: Mimi    Visit #: 12  Today's Date: 2/4/2025    Precautions: at risk of falls     Pain: 0/10    Subjective: No new information to report.     Objective:   Gait training (40 mins):   TDM training with harness no BWS -laser for visual cue, music for gulshan    - 0.9 mph 2x8 mins -seated rest after bout    Stairs: 4 in risers -BUE support on rails- step to gait pattern ascent and descent - SBA - 3 bouts     Discussed POC re: PT. Information re: medical fitness program also discussed with pt and . -5 mins    Patient Education:  Encouraged continued performance of HEP- additional education as above       Assessment:  Quality of gait continues to improve. Pt tolerated session well without adverse response.       Plan: continue treadmill training    Charges: 3 GT     Total Timed Treatment: 40 min  Total Treatment Time: 40 min      21st Century Cures Act Notice to Patient: Medical documents like this are made available to patients in the interest of transparency. However, be advised this is a medical document and it is intended as kaeg-we-yyva communication between your medical providers. This medical document may contain abbreviations, assessments, medical data, and results or other terms that are unfamiliar. Medical documents are intended to carry relevant information, facts as evident, and the clinical opinion of the practitioner. As such, this medical document may be written in language that appears blunt or direct. You are encouraged to contact your medical provider and/or [facility] Patient Experience if you have any questions about this medical document.

## 2025-02-06 ENCOUNTER — OFFICE VISIT (OUTPATIENT)
Dept: OCCUPATIONAL MEDICINE | Facility: HOSPITAL | Age: 70
End: 2025-02-06
Attending: Other
Payer: MEDICARE

## 2025-02-06 PROCEDURE — 97112 NEUROMUSCULAR REEDUCATION: CPT

## 2025-02-06 PROCEDURE — 97110 THERAPEUTIC EXERCISES: CPT

## 2025-02-06 NOTE — PROGRESS NOTES
Diagnosis:   Spasticity as late effect of cerebrovascular accident (CVA) (I69.398,R25.2)       Referring Provider: Issa Le  Date of Evaluation:    12/6/2024    Precautions:  Hx of CVA, HTN Next MD visit:   none scheduled  Date of Surgery: n/a   Insurance Primary/Secondary: BCBS OUT OF STATE / N/A     # Auth Visits: no auth            Subjective: Pt states receiving botox injection 2 weeks ago. Pt verbalized minimal relief.     Pain: 0/10 not being seen for pain     Objective: See exercises flowsheet below for exercises and activities performed today. All exercises performed bilaterally.      Assessment: Pt slightly more flexible today with exercises. Pt able to flex and extend elbow with less shoulder involvement than prior sessions    Goals: (to be met in 12 visits)  Pt will be independent and compliant with comprehensive HEP to maintain progress achieved in OT  Pt will increase their (R) shoulder flexion and abduction to at least 90 degrees for ease of dressing  Pt will increase their (R) shoulder flexion and abduction to at least 3+/5  for ease of transfers.   Pt will decrease their QuickDASH score by 10% in order to demo improvements in functional independence.     Plan: continue again with isometric strengthening.  Date 12/11/2024  12/16/2024  12/18/2024  12/23/2024  12/30/2024  1/8/2025  1/13/2025  1/15/25 2/6/2025   Visit # 2/12  3/12  4/12  5/12  6/12  7/12  8/12 9/12 10/12                        Evaluation                  Manual                                                   Ther ex                                    Towel slides  -flex/extend shoulder  -circles    Scapular elevation 20x1    Scapular retraction  20x1    AAROM elbow flex/extend 10x3 Scapular elevation 20x1    Scapular retraction  20x1    AAROM Elbow extend/flexion 10x3    AAROM shoulder abd/add 10x3 Scapular elevation 20x1    Scapular retraction  20x1    Towel slides  -flex/extend shoulder  -circles  10x2 Scapular elevation  10x3    Scapular retraction  10x3    Shoulder circles 10x2 Scapular elevation 10x3    Scapular retraction  10x3    AAROM elbow flex/extend 10x3    Arm bike trial  with cuff(25 mins) Scapular elevation 10x3    Scapular retraction  10x3  AAROM elbow flex/extend 10x3    Scapular elevation 10x3   AAROM elbow flex/extend 10x3    Scapular elevation 10x3    Scapular retraction  10x3    Cane with universal cuff elbow extension 10x2 into shoulder abduction 10x2   HEP instruction                     Therapeutic Activity                                                   Neuromuscular Re-education                                Cane ex: 10x2  Seated elbow extension, shoulder abduction 10x2    Supine BUE   -shoulder flex  -internal/external rotation  10x3   Supine BUE   -shoulder flex  -internal/external rotation  10x3    PNF pattern reaching to cane 10x3   Supine BUE   -shoulder flex  -internal/external rotation  10x3    1# cane with universal cuff  -elbow flexion  -shoulder abduction      -AAROM shoulder flexion/extension 10x2    Seated 1# cane with universal cuff  -elbow flexion  -shoulder abduction Supine AAROM  -shoulder flex  -internal/external rotation  -elbow flex/extend  -wrist flex/extend  10x3   Isometric shoulder extension 10x3, 5 sec holds    Isometric shoulder abduction 10x3, 5 sec holds    Supine AAROM  -shoulder flex/extend 10x2  -internal/external rotation  -elbow flex/extend  10x3   isometric shoulder extension 10x3, 5 sec holds    Isometric shoulder abduction 10x3, 5 sec holds    Supine AAROM 10x2  -shoulder flex  -shoulder adduction  -shoulder external/internal rotation Supine AAROM 10x2  -shoulder flex  -shoulder adduction  -shoulder external/internal rotation    Isometric shoulder abduction 10x3, 5 sec holds   Modalities                                                  HEP:  Assignment date:   Towel slides 12/06/24   Supine shoulder AAROM 12/30/2024           Access Code: [email or web address]  URL:  [email or web address]  Date: 12/30/2024  Prepared by: Nancy URIBE MS, OTR/L    Exercises  - Supine Shoulder Flexion AAROM with Hands Clasped  - 2 x daily - 7 x weekly - 1 sets - 20 reps    Charges: NMRE 2 (30), TE 1 (10)     Total Timed Treatment: 40 min  Total Treatment Time: 40 min

## 2025-02-10 ENCOUNTER — OFFICE VISIT (OUTPATIENT)
Dept: OCCUPATIONAL MEDICINE | Facility: HOSPITAL | Age: 70
End: 2025-02-10
Attending: Other
Payer: MEDICARE

## 2025-02-10 ENCOUNTER — OFFICE VISIT (OUTPATIENT)
Dept: PHYSICAL THERAPY | Facility: HOSPITAL | Age: 70
End: 2025-02-10
Attending: Other
Payer: MEDICARE

## 2025-02-10 PROCEDURE — 97110 THERAPEUTIC EXERCISES: CPT

## 2025-02-10 PROCEDURE — 97116 GAIT TRAINING THERAPY: CPT

## 2025-02-10 NOTE — PROGRESS NOTES
Diagnosis (evaluation date): L hemiparesis, difficulty walking, hx of stroke  (eval 11/18/24, PN 1/8/25)  POC: 15 visits   Insurance/# visits authorized: Medicare/no auth                         Referring Provider: Mimi    Visit #: 13  Today's Date: 2/10/2025    Precautions: at risk of falls     Pain: 0/10    Subjective: No new information to report.     Objective:   Gait training (40 mins):   TDM training with harness no BWS -laser for visual cue, music for gulshan     - 0.9 mph 2x8 mins -seated rest after bout    Stairs: 4 in risers -BUE support on rails- step to gait pattern ascent and descent - SBA - 1 bout  Stairs: 6 in risers- BUE support on rails- step to gait pattern ascent and descent -SBA- VC's for ant wt shift to reduce LOB backwards- 3 bouts     Assessment:  Pt tolerated session well without adverse response. Reports feeling fatigued at end of session.       Plan: continue treadmill training; standard staircase    Charges: 3 GT     Total Timed Treatment: 39 min  Total Treatment Time: 39 min      21st Century Cures Act Notice to Patient: Medical documents like this are made available to patients in the interest of transparency. However, be advised this is a medical document and it is intended as diik-ft-rnts communication between your medical providers. This medical document may contain abbreviations, assessments, medical data, and results or other terms that are unfamiliar. Medical documents are intended to carry relevant information, facts as evident, and the clinical opinion of the practitioner. As such, this medical document may be written in language that appears blunt or direct. You are encouraged to contact your medical provider and/or [facility] Patient Experience if you have any questions about this medical document.

## 2025-02-10 NOTE — PROGRESS NOTES
Diagnosis:   Spasticity as late effect of cerebrovascular accident (CVA) (I69.398,R25.2)       Referring Provider: Issa Le  Date of Evaluation:    12/6/2024    Precautions:  Hx of CVA, HTN Next MD visit:   none scheduled  Date of Surgery: n/a   Insurance Primary/Secondary: BCBS OUT OF STATE / N/A     # Auth Visits: no auth            Subjective: Pt states having no pain or discomfort with exercises.     Pain: 0/10 not being seen for pain     Objective: See exercises flowsheet below for exercises and activities performed today. All exercises performed bilaterally.      Assessment: Pt mildy able to perform isometric abduction of the RUE. Pt continues to compensate for lack of motor control with R shoulder elevation.     Goals: (to be met in 12 visits)  Pt will be independent and compliant with comprehensive HEP to maintain progress achieved in OT  Pt will increase their (R) shoulder flexion and abduction to at least 90 degrees for ease of dressing  Pt will increase their (R) shoulder flexion and abduction to at least 3+/5  for ease of transfers.   Pt will decrease their QuickDASH score by 10% in order to demo improvements in functional independence.     Plan: Progress note next session/final 12th session.  Date 12/11/2024  12/16/2024  12/18/2024  12/23/2024  12/30/2024  1/8/2025  1/13/2025  1/15/25 2/6/2025 2/10/2025   Visit # 2/12  3/12  4/12  5/12  6/12  7/12  8/12 9/12 10/12 11/12                         Evaluation                   Manual                                                     Ther ex                                     Towel slides  -flex/extend shoulder  -circles    Scapular elevation 20x1    Scapular retraction  20x1    AAROM elbow flex/extend 10x3 Scapular elevation 20x1    Scapular retraction  20x1    AAROM Elbow extend/flexion 10x3    AAROM shoulder abd/add 10x3 Scapular elevation 20x1    Scapular retraction  20x1    Towel slides  -flex/extend shoulder  -circles  10x2 Scapular elevation  10x3    Scapular retraction  10x3    Shoulder circles 10x2 Scapular elevation 10x3    Scapular retraction  10x3    AAROM elbow flex/extend 10x3    Arm bike trial  with cuff(25 mins) Scapular elevation 10x3    Scapular retraction  10x3  AAROM elbow flex/extend 10x3    Scapular elevation 10x3   AAROM elbow flex/extend 10x3    Scapular elevation 10x3    Scapular retraction  10x3    Cane with universal cuff elbow extension 10x2 into shoulder abduction 10x2 AAROM elbow flex/extend 10x3    Scapular elevation 10x3    Scapular retraction  10x3    AAROM functional reaching to target 10x2 (crossing midline L/R, above head, in between legs)    Blue web  and twist with LUE 10x2     HEP instruction                      Therapeutic Activity                                                     Neuromuscular Re-education                                 Cane ex: 10x2  Seated elbow extension, shoulder abduction 10x2    Supine BUE   -shoulder flex  -internal/external rotation  10x3   Supine BUE   -shoulder flex  -internal/external rotation  10x3    PNF pattern reaching to cane 10x3   Supine BUE   -shoulder flex  -internal/external rotation  10x3    1# cane with universal cuff  -elbow flexion  -shoulder abduction      -AAROM shoulder flexion/extension 10x2    Seated 1# cane with universal cuff  -elbow flexion  -shoulder abduction Supine AAROM  -shoulder flex  -internal/external rotation  -elbow flex/extend  -wrist flex/extend  10x3   Isometric shoulder extension 10x3, 5 sec holds    Isometric shoulder abduction 10x3, 5 sec holds    Supine AAROM  -shoulder flex/extend 10x2  -internal/external rotation  -elbow flex/extend  10x3   isometric shoulder extension 10x3, 5 sec holds    Isometric shoulder abduction 10x3, 5 sec holds    Supine AAROM 10x2  -shoulder flex  -shoulder adduction  -shoulder external/internal rotation Supine AAROM 10x2  -shoulder flex  -shoulder adduction  -shoulder external/internal rotation    Isometric shoulder  abduction 10x3, 5 sec holds Isometric shoulder abduction and extension 10x3, 5 sec holds   Modalities                                                    HEP:  Assignment date:   Towel slides 12/06/24   Supine shoulder AAROM 12/30/2024           Access Code: [email or web address]  URL: [email or web address]  Date: 12/30/2024  Prepared by: Nancy URIBE MS, OTR/L    Exercises  - Supine Shoulder Flexion AAROM with Hands Clasped  - 2 x daily - 7 x weekly - 1 sets - 20 reps    Charges: TE 3   Total Timed Treatment: 40 min  Total Treatment Time: 40 min

## 2025-02-17 ENCOUNTER — OFFICE VISIT (OUTPATIENT)
Dept: OCCUPATIONAL MEDICINE | Facility: HOSPITAL | Age: 70
End: 2025-02-17
Attending: Other
Payer: MEDICARE

## 2025-02-17 PROCEDURE — 97110 THERAPEUTIC EXERCISES: CPT

## 2025-02-17 NOTE — PROGRESS NOTES
Diagnosis:   Spasticity as late effect of cerebrovascular accident (CVA) (I69.398,R25.2)       Referring Provider: Issa Le  Date of Evaluation:    12/6/2024    Precautions:  Hx of CVA, HTN Next MD visit:   none scheduled  Date of Surgery: n/a   Insurance Primary/Secondary: BCBS OUT OF STATE / N/A     # Auth Visits: no auth              Discharge Summary  Pt has attended 12 visits in Occupational Therapy.     Subjective: Pt states some improvement in her R arm.    Assessment: Pt has worked towards increasing her AROM and functioning of her RUE s/p CVA. Pt has gain knowledge in activity modification and a stretching HEP, however, progression has been slow. Pt met 2/4 goals and progressed in the others. Pt has reach maximum functional potential at this time and would benefit from a break in skilled OT to focus at home on exercises. Pt would be a good candidate for return to skilled OT if progression is shown at home. At this time pt will be discharged from OT services.    Objective Data:     ROM: WNL KATELYN UE except below  Shoulder Eval Shoulder 2/17/2025   Flexion: R 40  Abduction: R 45 Flexion: R 50  Abduction: R 60     STRENGTH/MMT: 5/5 KATELYN UE except below:  Shoulder Eval Shoulder 2/17/2025   Flexion: R 2+/5; L 5/5  Abduction: R 2+/5; L 5/5  ER: R 0/5; L 5/5  IR: R 0/5; L 5/5 Flexion: R 2+/5  Abduction: R 2+/5  ER: R 2+/5  IR: R 0/5         Goals:   Pt will be independent and compliant with comprehensive HEP to maintain progress achieved in OT-MET  Pt will increase their (R) shoulder flexion and abduction to at least 90 degrees for ease of dressing-PROGRESSED  Pt will increase their (R) shoulder flexion and abduction to at least 3+/5  for ease of transfers- NOT met  Pt will decrease their QuickDASH score by 10% in order to demo improvements in functional independence.-MET     Plan: Discharge pt    Patient/Family/Caregiver was advised of these findings, precautions, and treatment options and has agreed to actively  participate in planning and for this course of care.    Thank you for your referral. If you have any questions, please contact me at Dept: [phone].    Sincerely,  Electronically signed by therapist: Nancy Butts, OT    Physician's certification required: No  Please co-sign or sign and return this letter via fax as soon as possible to [phone].   I certify the need for these services furnished under this plan of treatment and while under my care.    X___________________________________________________ Date____________________    Certification From: 2/17/2025  To:5/18/2025                Treatment Objective: See exercises flowsheet below for exercises and activities performed today.         Date 12/11/2024  12/16/2024  12/18/2024  12/23/2024  12/30/2024  1/8/2025  1/13/2025  1/15/25 2/6/2025 2/10/2025 2/17/2025    Visit # 2/12  3/12  4/12  5/12  6/12  7/12  8/12 9/12 10/12 11/12 12/12                          Evaluation                    Manual                                                       Ther ex                                      Towel slides  -flex/extend shoulder  -circles    Scapular elevation 20x1    Scapular retraction  20x1    AAROM elbow flex/extend 10x3 Scapular elevation 20x1    Scapular retraction  20x1    AAROM Elbow extend/flexion 10x3    AAROM shoulder abd/add 10x3 Scapular elevation 20x1    Scapular retraction  20x1    Towel slides  -flex/extend shoulder  -circles  10x2 Scapular elevation 10x3    Scapular retraction  10x3    Shoulder circles 10x2 Scapular elevation 10x3    Scapular retraction  10x3    AAROM elbow flex/extend 10x3    Arm bike trial  with cuff(25 mins) Scapular elevation 10x3    Scapular retraction  10x3  AAROM elbow flex/extend 10x3    Scapular elevation 10x3   AAROM elbow flex/extend 10x3    Scapular elevation 10x3    Scapular retraction  10x3    Cane with universal cuff elbow extension 10x2 into shoulder abduction 10x2 AAROM elbow flex/extend 10x3    Scapular  elevation 10x3    Scapular retraction  10x3    AAROM functional reaching to target 10x2 (crossing midline L/R, above head, in between legs)    Blue web  and twist with LUE 10x2   Scapular elevation 10x3    Scapular retraction  10x3    Towel slides  -flex/extend shoulder  -circles  10x2   HEP instruction                       Therapeutic Activity                                                       Neuromuscular Re-education                                  Cane ex: 10x2  Seated elbow extension, shoulder abduction 10x2    Supine BUE   -shoulder flex  -internal/external rotation  10x3   Supine BUE   -shoulder flex  -internal/external rotation  10x3    PNF pattern reaching to cane 10x3   Supine BUE   -shoulder flex  -internal/external rotation  10x3    1# cane with universal cuff  -elbow flexion  -shoulder abduction      -AAROM shoulder flexion/extension 10x2    Seated 1# cane with universal cuff  -elbow flexion  -shoulder abduction Supine AAROM  -shoulder flex  -internal/external rotation  -elbow flex/extend  -wrist flex/extend  10x3   Isometric shoulder extension 10x3, 5 sec holds    Isometric shoulder abduction 10x3, 5 sec holds    Supine AAROM  -shoulder flex/extend 10x2  -internal/external rotation  -elbow flex/extend  10x3   isometric shoulder extension 10x3, 5 sec holds    Isometric shoulder abduction 10x3, 5 sec holds    Supine AAROM 10x2  -shoulder flex  -shoulder adduction  -shoulder external/internal rotation Supine AAROM 10x2  -shoulder flex  -shoulder adduction  -shoulder external/internal rotation    Isometric shoulder abduction 10x3, 5 sec holds Isometric shoulder abduction and extension 10x3, 5 sec holds Isometric shoulder abduction and extension 10x3, 5 sec holds   Modalities                                                      HEP:  Assignment date:   Towel slides 12/06/24   Supine shoulder AAROM 12/30/2024           Access Code: [email or web address]  URL: [email or web address]  Date:  12/30/2024  Prepared by: Nancy URIBE MS, OTR/L    Exercises  - Supine Shoulder Flexion AAROM with Hands Clasped  - 2 x daily - 7 x weekly - 1 sets - 20 reps      Charges: TE 3   Total Timed Treatment: 40 min  Total Treatment Time: 45 min

## 2025-02-18 ENCOUNTER — OFFICE VISIT (OUTPATIENT)
Dept: PHYSICAL THERAPY | Facility: HOSPITAL | Age: 70
End: 2025-02-18
Attending: Other
Payer: MEDICARE

## 2025-02-18 PROCEDURE — 97116 GAIT TRAINING THERAPY: CPT

## 2025-02-18 NOTE — PROGRESS NOTES
Diagnosis (evaluation date): L hemiparesis, difficulty walking, hx of stroke  (eval 11/18/24, PN 1/8/25)  POC: 15 visits   Insurance/# visits authorized: Medicare/no awilda                         Referring Provider: Mimi    Visit #: 14  Today's Date: 2/18/2025    Precautions: at risk of falls     Pain: 0/10    Subjective:  asking about knee brace to help control hyperextension with ambulation. She feels slight improvement since having Botox.     Objective:   Gait training (42 mins):   TDM training with harness no BWS -music for gulshan     - 0.9-1.0 mph x8 mins -seated rest after bout    Discussed rationale for increased heel lift height to help control hyperEXT over use of knee brace. Increased height added to heel lift B - pt ambulating overground - community distances 2 bouts with SBQC and w/c follow- seated rest after ea bout     Standard staircase- 1 small flight- performed with 1 rail- step to gait pattern used- SBA-CGA for balance, VC's for ant wt shift during stair descent- seated rest post     Patient education: discussed paperwork for medical fitness program and process for enrolling in program. Encouraged continued exercise at home- suggested hand over hand assist for UE bike initially to help facilitate movement as pt's  reports issues with this. Additional education provided as above.     Assessment:  Pt demo's inconsistent control of R knee into extension during stance phase of gait with hyperextension noted ~75% of the time. This seems to be related to step length on the R influencing extension moment. After increasing height of heel lift this was greatly improved- no hyperextension noted during OG ambulation throughout remainder of session. Pt with reduced ant wt shift required to achieve foot flat during stair negotiation within AFO. Pt was provided cues throughout without significant change in gait pattern. Rationale for anterior wt shift explained to patient. Pt and  verbalize  understanding of material taught today.       Plan: reassess     Charges: 3 GT     Total Timed Treatment: 42 min  Total Treatment Time: 42 min      21st Century Cures Act Notice to Patient: Medical documents like this are made available to patients in the interest of transparency. However, be advised this is a medical document and it is intended as rsgc-ey-iezm communication between your medical providers. This medical document may contain abbreviations, assessments, medical data, and results or other terms that are unfamiliar. Medical documents are intended to carry relevant information, facts as evident, and the clinical opinion of the practitioner. As such, this medical document may be written in language that appears blunt or direct. You are encouraged to contact your medical provider and/or [facility] Patient Experience if you have any questions about this medical document.

## 2025-02-19 ENCOUNTER — APPOINTMENT (OUTPATIENT)
Dept: OCCUPATIONAL MEDICINE | Facility: HOSPITAL | Age: 70
End: 2025-02-19
Attending: Other
Payer: MEDICARE

## 2025-02-20 ENCOUNTER — APPOINTMENT (OUTPATIENT)
Dept: OCCUPATIONAL MEDICINE | Facility: HOSPITAL | Age: 70
End: 2025-02-20
Attending: Other
Payer: MEDICARE

## 2025-02-25 ENCOUNTER — APPOINTMENT (OUTPATIENT)
Dept: PHYSICAL THERAPY | Facility: HOSPITAL | Age: 70
End: 2025-02-25
Attending: Other
Payer: MEDICARE

## 2025-03-05 ENCOUNTER — OFFICE VISIT (OUTPATIENT)
Dept: PHYSICAL THERAPY | Facility: HOSPITAL | Age: 70
End: 2025-03-05
Attending: Other
Payer: MEDICARE

## 2025-03-05 PROCEDURE — 97110 THERAPEUTIC EXERCISES: CPT

## 2025-03-05 PROCEDURE — 97530 THERAPEUTIC ACTIVITIES: CPT

## 2025-03-05 NOTE — PROGRESS NOTES
Discharge Summary    Referring Provider: Mimi    Diagnosis: L hemiparesis, difficulty walking, hx of stroke     Pt has attended 15 visits in Physical Therapy.     Subjective: Pt reports improvement in gait with PT. Her  also notes these improvements. He reports she has been practicing walking without the AFO short distances. He feels as if the lower strap is causing issues and wonders if it can be removed. He also is wondering if she could get a brace that is shorter up the leg. She reports continued compliance to her [facility] including walking and cycling. She is planning to start an exercise program at the local [facility] this week as well.     Pain: 0/10    Assessment: Pt demonstrates improvements in RLE strength on MMT. Her functional gait speed is also improved as evidenced by improvement in TUG. This is likely due to increased symmetry of gait though this remains highly variable. No change in 5 times sit to stand is noted. Pt may benefit from articulation of her AFO to allow more movement at ankle and to facilitate increased ease of sit to stand and stair negotiation due to increased allowance of tibial translation. Will discuss with orthotist. Majority of goals have been met at this time. Pt and  verbalize understanding of material taught today. She is to be discharged to [facility] at this time, no further skilled needs.     Objective:     *values from last testing documented in parenthesis below     Strength:   Strength (-/5)    R   Hip      Flexion 3+ (3)   Knee      Flexion (Seated) 2 (unchanged)     Extension 5 (4+)   Ankle    DF 3- (1+)   PF (seated) 1+ (0)       5x Sit to Stand: 29 sec (28.2 seconds)     Timed Up and Go (AD, time): 43.4 sec (51.8 seconds) SBQC       Pt and  educated on rationale for why AFO's are designed to go up the leg to below knee and why short AFO's are not provided as her  described. Educated pt can trial not using the lower strap but that her foot may not stay in  the brace as well as it does with the strap. She could also follow-up with orthotist regarding this. Discussed articulation of the AFO and benefits of this. Advised may need new order but will reach out to orthotist about this. Explained insurance will likely not pay for a new brace so modifications of the current would be best/first course of action. Explained foot IR coming from rotation at hip. Advised caution when ambulating without AFO due to risk of possible injury without the support of the brace. Discussed improvements in therapy and reasons for return in future.     Goals:   Goals to be met within POC or 90 days:  Pt to be independent in HEP Met 3/5   Pt will improve functional gait speed in order to perform TUG in 55 seconds or less. Met 1/8/25  Pt will improve functional strength in BLE in order to perform 5 times sit to  25 seconds or less. Not met   Pt will improve L hip flexor strength to at least 3+/5 in order allow for increased efficiency and safety of ambulation. Met 3/5   New goal (1/8/25): Pt will improve quality and symmetry of gait in order to perform TUG in less than 45 seconds. Met 3/5     Charges: 1 TA, 1 TE     Total timed treatment: 26 mins  Total treatment time: 26 mins       Plan: Discharge    Patient/Family/Caregiver was advised of these findings, precautions, and treatment options and has agreed to actively participate in planning and for this course of care.    Thank you for your referral. If you have any questions, please contact me at Dept: [phone]    Sincerely,    Kaylie Melgar PT, NCS    Electronically signed by therapist: Kaylie Melgar PT      21st Century Cures Act Notice to Patient: Medical documents like this are made available to patients in the interest of transparency. However, be advised this is a medical document and it is intended as bnoz-fv-aqqn communication between your medical providers. This medical document may contain abbreviations, assessments,  medical data, and results or other terms that are unfamiliar. Medical documents are intended to carry relevant information, facts as evident, and the clinical opinion of the practitioner. As such, this medical document may be written in language that appears blunt or direct. You are encouraged to contact your medical provider and/or [facility] Patient Experience if you have any questions about this medical document.

## 2025-05-08 ENCOUNTER — TELEPHONE (OUTPATIENT)
Dept: PHYSICAL THERAPY | Facility: HOSPITAL | Age: 70
End: 2025-05-08

## 2025-05-09 ENCOUNTER — ORDER TRANSCRIPTION (OUTPATIENT)
Dept: PHYSICAL THERAPY | Facility: HOSPITAL | Age: 70
End: 2025-05-09

## 2025-05-09 DIAGNOSIS — Z86.73 STATUS POST CVA: Primary | ICD-10-CM

## 2025-06-12 ENCOUNTER — OFFICE VISIT (OUTPATIENT)
Dept: PHYSICAL THERAPY | Facility: HOSPITAL | Age: 70
End: 2025-06-12
Attending: Other
Payer: MEDICARE

## 2025-06-12 ENCOUNTER — OFFICE VISIT (OUTPATIENT)
Dept: OCCUPATIONAL MEDICINE | Facility: HOSPITAL | Age: 70
End: 2025-06-12
Attending: Other
Payer: MEDICARE

## 2025-06-12 DIAGNOSIS — Z86.73 STATUS POST CVA: Primary | ICD-10-CM

## 2025-06-12 PROCEDURE — 97110 THERAPEUTIC EXERCISES: CPT

## 2025-06-12 PROCEDURE — 97165 OT EVAL LOW COMPLEX 30 MIN: CPT

## 2025-06-12 PROCEDURE — 97162 PT EVAL MOD COMPLEX 30 MIN: CPT

## 2025-06-12 NOTE — PROGRESS NOTES
NEUROLOGICAL EVALUATION:     Diagnosis:   Status post CVA (Z86.73) Patient:  Federica Crowell (70 year old, female)        Referring Provider: Issa eL  Today's Date   6/12/2025    Precautions:  Fall Risk Date of Evaluation: 06/12/25       PATIENT SUMMARY     Summary of chief complaints: decreased mobility  History of current condition: Pt gets botox in the arm and leg every month.. Pt was seeing therapy over at [facility] for the walking and balance. She was unable to return to [facility] to coordinate both PT and OT this round of therapy. Right side is hemiparetic. She has AFO on the R ankle, can walk with this on. Uses a quad base cane to walk. During the day she walks around with the cane. Feels like everything is about the same when she finished last therapy. She did look at getting the hinged AFO, however orthotist determined this would not work due to strength issues. Pt would like to work on improving R quad strength for walking and ability to negotiate bathroom safer.   Pain level: current 0 /10, at best 0 /10, at worst 0 /10  Description of symptoms:     Occupation: retired   Leisure activities/Hobbies:     Prior level of function: prior to stroke, no limitations  Current limitations: assist needed for ADL activites, limited walking endurance  Pt goals: wants to work on strength and walking  History of falls: Yes one fall April 2024, thinks she was walking too fast.   Home Environment: everything on one level, 5 stairs to get into the home   ADLs: needs cane or hand hold assist to walk,  helps with ADL activities     Past medical history was reviewed with Federica.  Significant findings include:    Imaging/Tests:     Federica  has no past medical history on file.  She  has a past surgical history that includes removal gallbladder.    ASSESSMENT  Federica presents to physical therapy evaluation with primary c/o decreased mobility. The results of the objective tests and measures show  at increased risk for falls based on 5x STS, TUG and Frank, decreased RLE strength, assist needed for ADL activites, slow walking speed, abnormal gait mechanics listed below. Functional deficits include but are not limited to assist needed for ADL activites, limited walking endurance. Signs and symptoms are consistent with diagnosis of Status post CVA (Z86.73). Pt and PT discussed evaluation findings, pathology, POC and HEP.  Pt voiced understanding and performs HEP correctly without reported pain. Skilled Physical Therapy is medically necessary to address the above impairments and reach functional goals.    OBJECTIVE:      Musculoskeletal:  Observation/Posture: AFO on RLE, hemiparetic RUE     ROM and Strength:  (* denotes performed with pain)  Myotome MMT   MMT (-/5)    R L   Hip Flex (L2) 4 4+   Knee Ext (L3) 3+ 5   Ankle DF (L4) -- (not assessed due to AFO) 5   Ankle PF (S1) -- (not assessed due to AFO) 5   Knee flexion (S2) 2 4       Neurological:  Coordination:  Finger to Nose: NT   Pronation/Supination: NT   Toe Tap: NT     Sensation:       SILT     Deep Tendon Reflexes: WNL except     Tone: WNL except       Pathological Reflexes:  Babinski: NT   Clonus: NT   Sandhu's Sign: NT     Balance and Functional Mobility:    Postural Control:  Romberg EO: level surface 30 sec; compliant surface       Romberg EC: level surface 30 sec; compliant surface    Tandem Stance: R back: 30 sec (Modified tandem); L back: 22 sec (Modified tandem); Fall Risk: Yes  SLS: R: 0 sec; L: 0 sec; Fall Risk: Yes    Gait: pt ambulates on level ground with decreased step length; decreased stance phase; stooped posture/forward lean; assistive device (Assistive device of small base quad cane, AFO on RLE. Decreased step length with LLE/decreased stance time on RLE, slight circumduction of RLE during swing phase)  Timed Up and Go (AD,time): 57 sec; Fall Risk: Yes  Functional Gait Assessment Score:  ; Fall Risk:    5x Sit -->Stand: 29 sec (pushing  off knee with L hand); Fall Risk: Yes    Frank Balance Scale     Item Description Score (0 worst-4 best)    3 1. Sitting to standing  2 2. Standing unsupported   4 3. Sitting unsupported   3 4. Standing to sitting   3 5. Transfers   3 6. Standing with eyes closed   2 7. Standing with feet together   2 8. Reaching forward with outstretched arm   3 9. Retrieving object from floor   2 10. Turning to look behind   2 11. Turning 360 degrees   1 12. Placing alternate foot on stool   2 13. Standing with one foot in front   0 14. Standing on one foot     Total 33/56 (less than 46/56 = fall risk)      Today's Treatment and Response:   Pt education was provided on exam findings, treatment diagnosis, treatment plan, expectations, and prognosis.    Today's Treatment       6/12/2025   Neuro Treatment   Therapeutic Exercise Pt education: findings of exam consistent with recent discharge from therapy in March, goals for PT this round, will start with 6 visits and reassess    Therapeutic Exercise Minutes 8   Evaluation Minutes 30   Total Time Of Timed Procedures 8   Total Time Of Service-Based Procedures 30   Total Treatment Time 38        Patient was instructed in and issued a HEP for:  continue HEP from previous PT    Charges:  PT EVAL: Moderate Complexity, Eval 1 TE 1  In agreement with evaluation findings and clinical rationale, this evaluation involved MODERATE COMPLEXITY decision making due to 1-2 personal factors/comorbidities, 3 or more body structures involved/activity limitations, and evolving symptoms as documented in the evaluation.                                                        PLAN OF CARE:      Goals: (to be met in 6 visits)    Not Met Progress Toward Partially Met Met   Pt will demonstrate improved knee extension strength to 4/5 to improve ability to rise from chair with equal weight shift to avoid excess strain on LLE.  [] [] [] []   Pt will perform TUG in <50 seconds with least restrictive AD, demonstrating  improved gait speed for community ambulation. [] [] [] []   Pt will perform Frank with score of 38/56 or greater to reduce risk for falls. [] [] [] []   Pt will display improved gait mechanics with equal stance time and step length to improve gait speed for community ambulation.  [] [] [] []   Pt will be independent and compliant with comprehensive HEP to maintain progress achieved in PT. [] [] [] []         Frequency / Duration: Patient will be seen 1-2x/week or a total of 6 visits over a 90 day period. Treatment will include: Gait training; Manual Therapy; Neuromuscular Re-education; Self-Care Home Management; Therapeutic Activities; Therapeutic Exercise; Home Exercise Program instruction; Electrical stimulation (unattended); Electrical stimulation (attended); Patient/Family Education    Education or treatment limitation: Communication   Rehab Potential: fair     QuickDASH Outcome Score  Score: (Proxy-Rptd) 9.09 % (6/12/2025  2:46 PM)      Patient/Family/Caregiver was advised of these findings, precautions, and treatment options and has agreed to actively participate in planning and for this course of care.    Thank you for your referral. Please co-sign or sign and return this letter via fax as soon as possible to [phone]. If you have any questions, please contact me at Dept: [phone]    Sincerely,  Electronically signed by therapist: Olena Candelario PT, DPT  Physician's certification required: Yes  I certify the need for these services furnished under this plan of treatment and while under my care.    X___________________________________________________ Date____________________    Certification From: 6/12/2025  To: 9/10/2025

## 2025-06-12 NOTE — PROGRESS NOTES
OT NEURO EVALUATION:     Patient:  Federica Radivojevic (70 year old, female)   Diagnosis:   Status post CVA (Z86.73)     Referring Provider: Issa Le  Today's Date   6/12/2025    Precautions:  Fall Risk   Date of Evaluation: 06/12/25       PATIENT SUMMARY     Summary of chief complaints: bilateral UE weakness and difficulties with cognition  History of current condition: Patient reports receiving monthly Botox injections to the right arm and leg. She previously attended outpatient therapy at [facility] for gait and balance but was unable to return due to scheduling conflicts in coordinating both PT and OT this round. Patient presents with right-sided hemiparesis and mild bilateral upper extremity weakness. She also reports cognitive challenges and expresses a desire to improve cognitive function through therapy.   Pain level: current 0 /10, at best 0 /10, at worst 5 /10  Description of symptoms: R arm is heavy, clikcing in the R shoulder   Current limitations: limited coordination and strength  Pt goals: increase overall ROM  Hand Dominance: right    Past medical history was reviewed with Federica.  Significant findings include:    Federica  has no past medical history on file.  She  has a past surgical history that includes removal gallbladder.    ASSESSMENT  Federica presents to occupational therapy evaluation with primary c/o bilateral UE weakness and difficulties with cognition. The results of the objective tests and measures show limites strength and difficulties with cognitive processes involving visuospacial and executive function, attention, language, abstraction, delayed attention, and orientation. Functional deficits include but are not limited to  . Signs and symptoms are consistent with diagnosis of Status post CVA (Z86.73). Pt and OT discussed evaluation findings, pathology, POC and HEP.  Pt voiced understanding and performs HEP correctly without reported pain. Skilled Occupational Therapy is  medically necessary to address the above impairments and reach functional goals.    OBJECTIVE:      Musculoskeletal  There were no vitals taken for this visit.  Observation:    Orthotics:    Edema:       ROM and Strength:  (* denotes performed with pain)    MMT will be assessed at next visit    Shoulder   MMT (-/5)    R L     Flex         Ext         Abd (C5)         IR         ER            Elbow   MMT (-/5)    R L     Flex (C6)         Ext (C7)         Pronation         Supination          Wrist   MMT (-/5)    R L     Flex (C7)         Ext (C6)         Ulnar Dev         Radial Dev            Hand ROM: Patient is able to make a full fist with BUE.      Will be assessed at following visit:  Hand Strength (lbs) R L             2 pt Pinch        3 pt Pinch        Lateral pinch          Neurological:  Cognition:   Overall Cognitive Status: impaired  Arousal/Alertness: appropriate responses to stimuli  Attention Span: attends with cues to redirect  Orientation Level: oriented to place; oriented to person; oriented to situation; disoriented to time  Memory: impaired working memory  Following Commands: follows one-step commands inconsistently  Initiation: appears intact  Motor Planning: intact  Perseveration: not present  Awareness of Errors: assistance required to identify errors made      [place] Cognitive Assessment: 11/30 with deficits involving visuospacial and executive function, attention, language, abstraction, delayed attention, and orientation     Perception: Assessed at following visit    Overall Perception Status:    Left Right Discrimination:    Body Scheme:    Left Attention:    Right Attention:     Position in Space:    Form Constancy:      Visual Closure:    Figure Ground:    Depth Perception:    Spatial Orientation:    Topographical Orientation:    Visual Discrimination:         Coordination:   Finger to Nose:  Will be assessed at next visit    ADLs/IADLs:  ADL's    Bathing: Dependent     Dressing:  Dependent     Feeding: Min A     Grooming: DependentDependent  IADL's     Homemaking: Dependent     Food Prep: Dependent     Driving: Dependent   Other Functional Mobility/ADL Comments:        Today's Treatment and Response:   Pt education was provided on exam findings, treatment diagnosis, treatment plan, expectations, and prognosis.    Today's Treatment       6/12/2025   OT Treatment   Therapeutic Exercise HEP Review    Therapeutic Exercise Min 10   Eval Min 35   Total Timed Procedures 10   Total Service Procedures 45   Total Time 45         Patient was instructed in and issued a HEP for:   Cognitive Recovery strategies:  W.R.A.P  Daily orientiation     Charges:  OT EVAL Low Complexity, x1, TEx1   Based on analysis of data from a problem-focused assessment from a brief chart review, clinical presentation of physical, cognitive and psychosocial skills, as well as review of patient rated outcome measures, this evaluation involved Low complexity decision making, with 1-3 occupational performance component deficits, no comorbidities, and no need for modification of tasks or assistance with assessment.                                                                PLAN OF CARE:      Goals: (to be met in 12 visits)   Pt will improve safety awareness and judgement to promote independence in the home and community.    Pt will increase endurance and activity tolerance to 30 minutes with no breaks to support participation in ADL/IADL.    Pt will be independent and compliant with comprehensive HEP to maintain progress achieved in OT.   Pt will improve sustained attention by independently completing a familiar task for 30 minutes with no breaks to support independence and success in activities like leisure and social participation.       Frequency / Duration: Patient will be seen 1-2 x/week or a total of 12 visits over a 90 day period. Treatment will include: Manual Therapy; Neuromuscular Re-education; Therapeutic Activities;  Self-Care Home Management; Therapeutic Exercise; Home Exercise Program instruction; Electrical stimulation (attended); Ultrasound    Education or treatment limitation: Cognition; Communication   Rehab Potential: good     QuickDASH Outcome Score  Score: (Proxy-Rptd) 9.09 % (6/12/2025  2:46 PM)      Patient/Family/Caregiver was advised of these findings, precautions, and treatment options and has agreed to actively participate in planning and for this course of care.    Thank you for your referral. Please co-sign or sign and return this letter via fax as soon as possible to [phone]. If you have any questions, please contact me at Dept: [phone]    Sincerely,  Electronically signed by therapist: [facility] OT  Physician's certification required: Yes  I certify the need for these services furnished under this plan of treatment and while under my care.    X___________________________________________________ Date____________________    Certification From: 6/12/2025  To: 9/10/2025

## 2025-06-16 ENCOUNTER — OFFICE VISIT (OUTPATIENT)
Dept: OCCUPATIONAL MEDICINE | Facility: HOSPITAL | Age: 70
End: 2025-06-16
Attending: Other
Payer: MEDICARE

## 2025-06-16 ENCOUNTER — OFFICE VISIT (OUTPATIENT)
Dept: PHYSICAL THERAPY | Facility: HOSPITAL | Age: 70
End: 2025-06-16
Attending: Other
Payer: MEDICARE

## 2025-06-16 PROCEDURE — 97112 NEUROMUSCULAR REEDUCATION: CPT

## 2025-06-16 PROCEDURE — 97110 THERAPEUTIC EXERCISES: CPT

## 2025-06-16 PROCEDURE — 97140 MANUAL THERAPY 1/> REGIONS: CPT

## 2025-06-16 PROCEDURE — 97530 THERAPEUTIC ACTIVITIES: CPT

## 2025-06-16 NOTE — PROGRESS NOTES
Patient: Federica Crowell (70 year old, female) Referring Provider:  Insurance:   Diagnosis: Status post CVA (Z86.73) Issa Itharry  MEDICARE   Date of Surgery: No data recorded Next MD visit:  [facility]   Precautions:  Fall Risk n/a Referral Information:   Date of Injury: No data recorded Date of Evaluation: Req: 0, Auth: 0, Exp:     06/12/25 POC Auth Visits:          Today's Date   6/16/2025    Subjective  Patient reports increased spasticity today. She describes the arm as feeling heavy and the leg as wanting to curl. After completing cognitive exercises, she expressed the belief that UE exercises are more important at this time, as the arm is currently impacting her function more than cognitive limitations.       Pain: 0/10     Objective  Patient participated in ROM and cognitive exercises. She reported no pain during the session and required minimal assistance to complete cognitive tasks.      Assessment  Patient continues to make progress in both cognitive and physical domains, though increased tone and heaviness in the UE suggest a need to prioritize neuromotor re-education and functional UE training. Her insight into her current limitations reflects improved awareness and engagement in the rehab process. Increased spasticity may be influenced by fatigue, positioning, or external stressors and should be monitored closely. Patient tolerated activities well and demonstrated appropriate participation, with minimal support needed for cognitive tasks--indicating a shift in focus toward physical function may be clinically appropriate at this time.    Goals (to be met in 12 visits)   Pt will improve safety awareness and judgement to promote independence in the home and community.    Pt will increase endurance and activity tolerance to 30 minutes with no breaks to support participation in ADL/IADL.    Pt will be independent and compliant with comprehensive HEP to maintain progress achieved in OT.   Pt will improve  sustained attention by independently completing a familiar task for 30 minutes with no breaks to support independence and success in activities like leisure and social participation.         Plan  Patient will be seen 1-2 x/week or a total of 12 visits over a 90 day period. Treatment will include: Manual Therapy; Neuromuscular Re-education; Therapeutic Activities; Self-Care Home Management; Therapeutic Exercise; Home Exercise Program instruction; Electrical stimulation (attended); Ultrasound    Treatment Last 4 Visits        6/12/2025 6/16/2025   OT Treatment   Treatment Day  2   Therapeutic Exercise HEP Review  Shoulder ROM   Therapeutic Activity  blink   Manual Therapy  Passive ROM: elbow flex/ext ; shoulder flex/ext/shoulder abd; wrist flex/ext/uln dev/ rad dev   Therapeutic Exercise Min 10 10   Ther Activity Min  20   Manual Therapy Min  15   Eval Min 35    Total Timed Procedures 10 45   Total Service Procedures 45 45   Total Time 45 45         HEP  Cognitive Recovery strategies:  HUDSON  Daily orientiation    Charges     TEx1, MTx1 TAx1

## 2025-06-16 NOTE — PROGRESS NOTES
Patient: Federica Crowell (70 year old, female) Referring Provider:  Insurance:   Diagnosis: Status post CVA (Z86.73) Arthur Itkin MEDICARE   Date of Surgery: No data recorded Next MD visit:  [facility]   Precautions:  Fall Risk No data recorded Referral Information:    Date of Evaluation: Req: 0, Auth: 0, Exp:     06/12/25 POC Auth Visits:  6       Today's Date   6/16/2025    Subjective  Pt doing well today, no changes to report.       Pain: pain not reported     Objective  None taken today          Assessment  Pt seen for first follow up. Focused intervention today on improving hip flexion of the R leg and weight bearing through R leg. When attempting hip flexion in standing, pt has difficulty isolating motion and recruits abductors to assist motion. With therapist guide motion, able to complete R hip flexion march in standing with min assist. During LAQ's, pt also compensations with hip flexion and internal rotation. Pt is able to isolate quad contraction following verbal and tactile cues from therapist. Pt able to bear weight through RLE to step over sabrina with LLE with 1 UE support. Attempted with RLE, however mod assist needed from therapist. Pt educated on flexing hip more through swing phase of gait to improve muscle activation and strength.    Goals (to be met in 6 visits)      Not Met Progress Toward Partially Met Met   Pt will demonstrate improved knee extension strength to 4/5 to improve ability to rise from chair with equal weight shift to avoid excess strain on LLE.  [] [] [] []   Pt will perform TUG in <50 seconds with least restrictive AD, demonstrating improved gait speed for community ambulation. [] [] [] []   Pt will perform Frank with score of 38/56 or greater to reduce risk for falls. [] [] [] []   Pt will display improved gait mechanics with equal stance time and step length to improve gait speed for community ambulation.  [] [] [] []   Pt will be independent and compliant with comprehensive  HEP to maintain progress achieved in PT. [] [] [] []             Plan  Continue per plan of care.    Treatment Last 4 Visits  Treatment Day: 2       6/12/2025 6/16/2025   Neuro Treatment   Therapeutic Exercise Pt education: findings of exam consistent with recent discharge from therapy in March, goals for PT this round, will start with 6 visits and reassess  NuStep, level 3, 5 min (band around knees to reduce RLE abduction)  Side stepping in // bars, x2 lengths   LAQ, 2x10 (therapist assist to reduce hip flexion)   Walking with cane in therapy gym, SBA, multiple bouts      Neuro Re-Education  Marching, 1 UE support, therapist assist for R hip flexion  Staggered STS with hemiparetic side back, 3x5 w/ UE support, therapist blocking at R knee and ankle   Standing on airex, no UE support, 3x20 sec   Lat taps over sabrina, LLE tapping, x10 1 UE support  L foot fwd taps over sabrina, x10 1 UE support, x5 with R foot   Standing reach taps to therapist hand, holding 1#, multiple bouts    Therapeutic Exercise Minutes 8 15   Neuro Re-Educ Minutes  25   Evaluation Minutes 30    Total Time Of Timed Procedures 8 40   Total Time Of Service-Based Procedures 30 0   Total Treatment Time 38 40        HEP       Charges  NR 2 TE 1

## 2025-06-18 ENCOUNTER — OFFICE VISIT (OUTPATIENT)
Dept: PHYSICAL THERAPY | Facility: HOSPITAL | Age: 70
End: 2025-06-18
Attending: Other
Payer: MEDICARE

## 2025-06-18 ENCOUNTER — OFFICE VISIT (OUTPATIENT)
Dept: OCCUPATIONAL MEDICINE | Facility: HOSPITAL | Age: 70
End: 2025-06-18
Attending: Other
Payer: MEDICARE

## 2025-06-18 PROCEDURE — 97110 THERAPEUTIC EXERCISES: CPT

## 2025-06-18 PROCEDURE — 97112 NEUROMUSCULAR REEDUCATION: CPT

## 2025-06-18 PROCEDURE — 97140 MANUAL THERAPY 1/> REGIONS: CPT

## 2025-06-18 PROCEDURE — 97530 THERAPEUTIC ACTIVITIES: CPT

## 2025-06-18 NOTE — PROGRESS NOTES
Patient: Federica Crowell (70 year old, female) Referring Provider:  Insurance:   Diagnosis: Status post CVA (Z86.73) Sisa Itharry  MEDICARE   Date of Surgery: No data recorded Next MD visit:  [facility]   Precautions:  Fall Risk n/a Referral Information:   Date of Injury: No data recorded Date of Evaluation: Req: 0, Auth: 0, Exp:     06/12/25 POC Auth Visits:          Today's Date   6/18/2025    Subjective  Patient presents to OT reporting that home exercises are going well overall. She noted difficulty completing UE ROM exercises at home due to limited  strength and function in the RUE.       Pain: 0/10     Objective  Patient engaged in RUE ROM and cognitive tasks during the session. She reported mild pain in the right hand during ROM exercises but was able to complete them with minimal assistance.           Assessment  Patient continues to demonstrate good participation in therapy and adherence to the home exercise program. Mild discomfort with RUE ROM and  limitations are impacting her ability to fully engage in exercises at home.    Goals (to be met in 12 visits)   Pt will improve safety awareness and judgement to promote independence in the home and community.    Pt will increase endurance and activity tolerance to 30 minutes with no breaks to support participation in ADL/IADL.    Pt will be independent and compliant with comprehensive HEP to maintain progress achieved in OT.   Pt will improve sustained attention by independently completing a familiar task for 30 minutes with no breaks to support independence and success in activities like leisure and social participation.           Plan  Patient will be seen 1-2 x/week or a total of 12 visits over a 90 day period. Treatment will include: Manual Therapy; Neuromuscular Re-education; Therapeutic Activities; Self-Care Home Management; Therapeutic Exercise; Home Exercise Program instruction; Electrical stimulation (attended); Ultrasound    Treatment Last  4 Visits        6/12/2025 6/16/2025 6/18/2025   OT Treatment   Treatment Day  2 3   Therapeutic Exercise HEP Review  Shoulder ROM Shoulder AAROM using Setredel   Therapy wheel shoulder wrist and elbow (bilateral      Therapeutic Activity  blink Perfection    Manual Therapy  Passive ROM: elbow flex/ext ; shoulder flex/ext/shoulder abd; wrist flex/ext/uln dev/ rad dev Passive ROM: elbow flex/ext ; shoulder flex/ext/shoulder abd; wrist flex/ext/uln dev/ rad dev   Therapeutic Exercise Min 10 10 15   Ther Activity Min  20 15   Manual Therapy Min  15 15   Eval Min 35     Total Timed Procedures 10 45 45   Total Service Procedures 45 45 45   Total Time 45 45 45         HEP  Cognitive Recovery strategies:  W.R.A.P  Daily orientiation    Charges     TAx1, TEx1, MTx1

## 2025-06-18 NOTE — PROGRESS NOTES
Patient: Federica Crowell (70 year old, female) Referring Provider:  Insurance:   Diagnosis: Status post CVA (Z86.73) Issa Le  MEDICARE   Date of Surgery: No data recorded Next MD visit:  [facility]   Precautions:  Fall Risk No data recorded Referral Information:    Date of Evaluation: Req: 0, Auth: 0, Exp:     06/12/25 POC Auth Visits:  6       Today's Date   6/18/2025    Subjective  No pain following last session       Pain: pain not reported     Objective  Short step length on LLE with gait - verbal cues to increase for equal step length bilat         Assessment  Pt able to continue with exercise in session with less rest today. With single leg marches, attempted eccentric lowering on RLE. Pt able to control motion, however min A still needed to control decent hip flexion. Improved LAQ form today, therapist continues tapping over quad on RLE for muscle facilitation. With gait today, emphasis on longer step length on LLE to improve step through gait pattern to increase gait speed and mechanics.    Goals (to be met in 6 visits)      Not Met Progress Toward Partially Met Met   Pt will demonstrate improved knee extension strength to 4/5 to improve ability to rise from chair with equal weight shift to avoid excess strain on LLE.  [] [] [] []   Pt will perform TUG in <50 seconds with least restrictive AD, demonstrating improved gait speed for community ambulation. [] [] [] []   Pt will perform Frank with score of 38/56 or greater to reduce risk for falls. [] [] [] []   Pt will display improved gait mechanics with equal stance time and step length to improve gait speed for community ambulation.  [] [] [] []   Pt will be independent and compliant with comprehensive HEP to maintain progress achieved in PT. [] [] [] []                 Plan  Continue per plan of care.    Treatment Last 4 Visits  Treatment Day: 3       6/12/2025 6/16/2025 6/18/2025   Neuro Treatment   Therapeutic Exercise Pt education: findings of exam  consistent with recent discharge from therapy in March, goals for PT this round, will start with 6 visits and reassess  NuStep, level 3, 5 min (band around knees to reduce RLE abduction)  Side stepping in // bars, x2 lengths   LAQ, 2x10 (therapist assist to reduce hip flexion)   Walking with cane in therapy gym, SBA, multiple bouts    NuStep, level 4, 6 min (band around knees to reduce RLE abduction)   Side stepping in // bars, x2 lengths   LAQ, 2x10 (therapist assist to reduce hip flexion)    Walking in gym with cane, multiple bouts during therapy   Neuro Re-Education  Marching, 1 UE support, therapist assist for R hip flexion  Staggered STS with hemiparetic side back, 3x5 w/ UE support, therapist blocking at R knee and ankle   Standing on airex, no UE support, 3x20 sec   Lat taps over sabrina, LLE tapping, x10 1 UE support  L foot fwd taps over sabrina, x10 1 UE support, x5 with R foot   Standing reach taps to therapist hand, holding 1#, multiple bouts  Fwd/retro walk in // bars, 1 UE, 2 laps   Marching, 1 UE support, therapist assist for R hip flexion, 2x10 bilat  Staggered STS with hemiparetic side back, 3x5 w/ UE support, therapist blocking at R knee and ankle  Standing on airex, no UE support, 3x20 sec  Standing on airex, head turns and nods, x10 each, light UE     Therapeutic Exercise Minutes 8 15 18   Neuro Re-Educ Minutes  25 25   Evaluation Minutes 30     Total Time Of Timed Procedures 8 40 43   Total Time Of Service-Based Procedures 30 0 0   Total Treatment Time 38 40 43        HEP       Charges  NR 2 TE 1

## 2025-06-23 ENCOUNTER — OFFICE VISIT (OUTPATIENT)
Dept: OCCUPATIONAL MEDICINE | Facility: HOSPITAL | Age: 70
End: 2025-06-23
Attending: Other
Payer: MEDICARE

## 2025-06-23 ENCOUNTER — OFFICE VISIT (OUTPATIENT)
Dept: PHYSICAL THERAPY | Facility: HOSPITAL | Age: 70
End: 2025-06-23
Attending: Other
Payer: MEDICARE

## 2025-06-23 PROCEDURE — 97112 NEUROMUSCULAR REEDUCATION: CPT

## 2025-06-23 PROCEDURE — 97140 MANUAL THERAPY 1/> REGIONS: CPT

## 2025-06-23 PROCEDURE — 97110 THERAPEUTIC EXERCISES: CPT

## 2025-06-23 PROCEDURE — 97116 GAIT TRAINING THERAPY: CPT

## 2025-06-23 NOTE — PROGRESS NOTES
Patient: Federica Crowell (70 year old, female) Referring Provider:  Insurance:   Diagnosis: Status post CVA (Z86.73) Issa Itharry  MEDICARE   Date of Surgery: No data recorded Next MD visit:  [facility]   Precautions:  Fall Risk n/a Referral Information:   Date of Injury: No data recorded Date of Evaluation: Req: 0, Auth: 0, Exp:     06/12/25 POC Auth Visits:          Today's Date   6/23/2025    Subjective  Patient presents to OT reporting that home exercises are going well. She inquired about recommendations for mirrors to perform mirror therapy at home, indicating continued engagement in her recovery.       Pain: 0/10     Objective  Patient participated in mirror therapy exercises focusing on various grasp and pinch patterns. Passive stretching was provided following the mirror therapy session.      Assessment  Patient demonstrates strong motivation and insight into her recovery, as evidenced by her request for home mirror therapy tools. Visible muscle twitches in the dorsal hand during exercises suggest positive neuromuscular activity and potential for motor recovery.    Goals (to be met in 12 visits)   Pt will improve safety awareness and judgement to promote independence in the home and community.    Pt will increase endurance and activity tolerance to 30 minutes with no breaks to support participation in ADL/IADL.    Pt will be independent and compliant with comprehensive HEP to maintain progress achieved in OT.   Pt will improve sustained attention by independently completing a familiar task for 30 minutes with no breaks to support independence and success in activities like leisure and social participation.             Plan  Patient will be seen 1-2 x/week or a total of 12 visits over a 90 day period. Treatment will include: Manual Therapy; Neuromuscular Re-education; Therapeutic Activities; Self-Care Home Management; Therapeutic Exercise; Home Exercise Program instruction; Electrical stimulation  (attended); Ultrasound    Treatment Last 4 Visits        6/12/2025 6/16/2025 6/18/2025 6/23/2025   OT Treatment   Treatment Day  2 3    Therapeutic Exercise HEP Review  Shoulder ROM Shoulder AAROM using dowel   Therapy wheel shoulder wrist and elbow (bilateral       Neuro Re-Educ    Mirror therapy: RUE blocked from vision with LUE completing:  - full hand grasp   - sponge  (finger adduction + fist)   Therapeutic Activity  blink Perfection     Manual Therapy  Passive ROM: elbow flex/ext ; shoulder flex/ext/shoulder abd; wrist flex/ext/uln dev/ rad dev Passive ROM: elbow flex/ext ; shoulder flex/ext/shoulder abd; wrist flex/ext/uln dev/ rad dev Passive ROM: elbow flex/ext ; shoulder flex/ext/shoulder abd; wrist flex/ext/uln dev/ rad dev   Neuro Re-Ed Min    35   Therapeutic Exercise Min 10 10 15    Ther Activity Min  20 15    Manual Therapy Min  15 15 10   Eval Min 35      Total Timed Procedures 10 45 45 45   Total Service Procedures 45 45 45 45   Total Time 45 45 45 45         HEP  Cognitive Recovery strategies:  W.R.A.P  Daily orientiation    Charges     NMREDx2, MTx1

## 2025-06-23 NOTE — PROGRESS NOTES
Patient: Federica Crowell (70 year old, female) Referring Provider:  Insurance:   Diagnosis: Status post CVA (Z86.73) Issa Le  MEDICARE   Date of Surgery: No data recorded Next MD visit:  [facility]   Precautions:  Fall Risk No data recorded Referral Information:    Date of Evaluation: Req: 0, Auth: 0, Exp:     06/12/25 POC Auth Visits:  6       Today's Date   6/23/2025    Subjective  Pt doing well today, no changes to report       Pain: pain not reported     Objective  See tx flow sheet          Assessment  Continued with interventions to improve RLE hip flexor, quad and hamstring strengthening. Pt able to complete seated step over weight for hip flexion on RLE, gait belt on thigh for LUE assist as needed. Pt has difficulty isolating hip flexors for motion and compensates with knee extension and trunk lateral lean to complete successfully, verbal and tactile cues given to attempt to correct. Improved weight shift through RLE with staggered STS today. Pt able to use agility ladder for improving step length bilat, however when in LLE stance phase, RLE has difficulty flexing hip enough to clear ladder without catching toes.    Goals (to be met in 6 visits)      Not Met Progress Toward Partially Met Met   Pt will demonstrate improved knee extension strength to 4/5 to improve ability to rise from chair with equal weight shift to avoid excess strain on LLE.  [] [] [] []   Pt will perform TUG in <50 seconds with least restrictive AD, demonstrating improved gait speed for community ambulation. [] [] [] []   Pt will perform Frank with score of 38/56 or greater to reduce risk for falls. [] [] [] []   Pt will display improved gait mechanics with equal stance time and step length to improve gait speed for community ambulation.  [] [] [] []   Pt will be independent and compliant with comprehensive HEP to maintain progress achieved in PT. [] [] [] []                     Plan  Continue per plan of care.    Treatment Last 4  Visits  Treatment Day: 4       6/12/2025 6/16/2025 6/18/2025 6/23/2025   Neuro Treatment   Therapeutic Exercise Pt education: findings of exam consistent with recent discharge from therapy in March, goals for PT this round, will start with 6 visits and reassess  NuStep, level 3, 5 min (band around knees to reduce RLE abduction)  Side stepping in // bars, x2 lengths   LAQ, 2x10 (therapist assist to reduce hip flexion)   Walking with cane in therapy gym, SBA, multiple bouts    NuStep, level 4, 6 min (band around knees to reduce RLE abduction)   Side stepping in // bars, x2 lengths   LAQ, 2x10 (therapist assist to reduce hip flexion)    Walking in gym with cane, multiple bouts during therapy NuStep, level 4, 5 min   Seated R hip flexion over weight, min assist from LUE for hip flexion  Side stepping in // bars, x1 lengths   Standing HS curls RLE, x10 (therapist assisting)     Neuro Re-Education  Marching, 1 UE support, therapist assist for R hip flexion  Staggered STS with hemiparetic side back, 3x5 w/ UE support, therapist blocking at R knee and ankle   Standing on airex, no UE support, 3x20 sec   Lat taps over sabrina, LLE tapping, x10 1 UE support  L foot fwd taps over sabrina, x10 1 UE support, x5 with R foot   Standing reach taps to therapist hand, holding 1#, multiple bouts  Fwd/retro walk in // bars, 1 UE, 2 laps   Marching, 1 UE support, therapist assist for R hip flexion, 2x10 bilat  Staggered STS with hemiparetic side back, 3x5 w/ UE support, therapist blocking at R knee and ankle  Standing on airex, no UE support, 3x20 sec  Standing on airex, head turns and nods, x10 each, light UE   Marching, 1 UE support, 2x10 bilat   Staggered STS with hemiparetic side back, 2x5 w/ UE support, therapist blocking at R knee and ankle   Fwd step with light UE to dot on floor, x10 bilat, therapist blocking RLE as needed      Gait Training    Ambulating in clinic with small base quad cane, VC for step length   Fwd/retro walk in  // bars, 1 UE, 2 laps (emphasis on equal step length bilat)  Walking in agility ladder, 2 laps, one foot per square   Therapeutic Exercise Minutes 8 15 18 15   Neuro Re-Educ Minutes  25 25 15   Gait Training Minutes    10   Evaluation Minutes 30      Total Time Of Timed Procedures 8 40 43 40   Total Time Of Service-Based Procedures 30 0 0 0   Total Treatment Time 38 40 43 40        HEP       Charges  TE 1 NR 1 GT 1

## 2025-06-25 ENCOUNTER — OFFICE VISIT (OUTPATIENT)
Dept: OCCUPATIONAL MEDICINE | Facility: HOSPITAL | Age: 70
End: 2025-06-25
Attending: Other
Payer: MEDICARE

## 2025-06-25 ENCOUNTER — OFFICE VISIT (OUTPATIENT)
Dept: PHYSICAL THERAPY | Facility: HOSPITAL | Age: 70
End: 2025-06-25
Attending: Other
Payer: MEDICARE

## 2025-06-25 PROCEDURE — 97140 MANUAL THERAPY 1/> REGIONS: CPT

## 2025-06-25 PROCEDURE — 97110 THERAPEUTIC EXERCISES: CPT

## 2025-06-25 PROCEDURE — 97112 NEUROMUSCULAR REEDUCATION: CPT

## 2025-06-25 NOTE — PROGRESS NOTES
Patient: Federica Crowell (70 year old, female) Referring Provider:  Insurance:   Diagnosis: Status post CVA (Z86.73) Issaaxel Le  MEDICARE   Date of Surgery: No data recorded Next MD visit:  [facility]   Precautions:  Fall Risk No data recorded Referral Information:    Date of Evaluation: Req: 0, Auth: 0, Exp:     06/12/25 POC Auth Visits:  6       Today's Date   6/25/2025    Subjective  No changes since last session, feeling well today.       Pain: pain not reported     Objective  Min A from therapist needed to reduce hip abduction on RLE with SLP on shuttle          Assessment  Pt able to complete leg strengthening on shuttle press both DL and SL. Good quad activation with activity. Requires min A for reducing hip abduction on RLE when flexing knee. Able to transfer on and off with min A for foot placement. With taps over sabrina, pt requires mod assist to isolate hip flexion range of motion on RLE. Pt able to stand for rest breaks versus sit throughout session.    Goals (to be met in 6 visits)      Not Met Progress Toward Partially Met Met   Pt will demonstrate improved knee extension strength to 4/5 to improve ability to rise from chair with equal weight shift to avoid excess strain on LLE.  [] [x] [] []   Pt will perform TUG in <50 seconds with least restrictive AD, demonstrating improved gait speed for community ambulation. [] [x] [] []   Pt will perform Frank with score of 38/56 or greater to reduce risk for falls. [] [x] [] []   Pt will display improved gait mechanics with equal stance time and step length to improve gait speed for community ambulation.  [] [x] [] []   Pt will be independent and compliant with comprehensive HEP to maintain progress achieved in PT. [] [x] [] []                         Plan  Continue per plan of care.    Treatment Last 4 Visits  Treatment Day: 5       6/16/2025 6/18/2025 6/23/2025 6/25/2025   Neuro Treatment   Therapeutic Exercise NuStep, level 3, 5 min (band around knees to  reduce RLE abduction)  Side stepping in // bars, x2 lengths   LAQ, 2x10 (therapist assist to reduce hip flexion)   Walking with cane in therapy gym, SBA, multiple bouts    NuStep, level 4, 6 min (band around knees to reduce RLE abduction)   Side stepping in // bars, x2 lengths   LAQ, 2x10 (therapist assist to reduce hip flexion)    Walking in gym with cane, multiple bouts during therapy NuStep, level 4, 5 min   Seated R hip flexion over weight, min assist from LUE for hip flexion  Side stepping in // bars, x1 lengths   Standing HS curls RLE, x10 (therapist assisting)   NuStep, level 4, 6 min   Shuttle press, DL, #50, 3x10  SLP, #25, x15 bilat  Review of current ex for home     Neuro Re-Education Marching, 1 UE support, therapist assist for R hip flexion  Staggered STS with hemiparetic side back, 3x5 w/ UE support, therapist blocking at R knee and ankle   Standing on airex, no UE support, 3x20 sec   Lat taps over sabrina, LLE tapping, x10 1 UE support  L foot fwd taps over sabrina, x10 1 UE support, x5 with R foot   Standing reach taps to therapist hand, holding 1#, multiple bouts  Fwd/retro walk in // bars, 1 UE, 2 laps   Marching, 1 UE support, therapist assist for R hip flexion, 2x10 bilat  Staggered STS with hemiparetic side back, 3x5 w/ UE support, therapist blocking at R knee and ankle  Standing on airex, no UE support, 3x20 sec  Standing on airex, head turns and nods, x10 each, light UE   Marching, 1 UE support, 2x10 bilat   Staggered STS with hemiparetic side back, 2x5 w/ UE support, therapist blocking at R knee and ankle   Fwd step with light UE to dot on floor, x10 bilat, therapist blocking RLE as needed    Multiple STS transfers from WC to Nustep and stand to supine to shuttle, CGA-Min A for LE placement  Single leg tapping over sabrina, x10 bilat, therapist assisting with RLE hip flexion   Lat step over sabrina, LLE tapping, x10   Standing w/ self selected STACY, head turns and nods, 2x10 each  *Standing and  seated rest for recovery      Gait Training   Ambulating in clinic with small base quad cane, VC for step length   Fwd/retro walk in // bars, 1 UE, 2 laps (emphasis on equal step length bilat)  Walking in agility ladder, 2 laps, one foot per square    Therapeutic Exercise Minutes 15 18 15 15   Neuro Re-Educ Minutes 25 25 15 25   Gait Training Minutes   10    Total Time Of Timed Procedures 40 43 40 40   Total Time Of Service-Based Procedures 0 0 0 0   Total Treatment Time 40 43 40 40        HEP       Charges  NR 2 TE 1

## 2025-06-25 NOTE — PROGRESS NOTES
Patient: Federica Crowell (70 year old, female) Referring Provider:  Insurance:   Diagnosis: Status post CVA (Z86.73) Issa Le  MEDICARE   Date of Surgery: No data recorded Next MD visit:  [facility]   Precautions:  Fall Risk n/a Referral Information:   Date of Injury: No data recorded Date of Evaluation: Req: 0, Auth: 0, Exp:     06/12/25 POC Auth Visits:          Today's Date   6/25/2025    Subjective  Patient reports she is doing well with her home exercise program. She noted that the mirror ordered for use with home-based mirror therapy is scheduled to be delivered soon.       Pain: 0/10     Objective  Patient engaged in upper extremity ROM exercises, fine motor activities, and various grasp patterns using mirror therapy. She demonstrated appropriate engagement and consistency throughout the session.        Assessment  Patient is progressing well. The presence of minor muscle twitches in the hidden (affected) hand during mirror therapy is a positive sign, suggesting emerging motor activation and cortical engagement. Her proactive approach to continuing therapy at home, including ordering the mirror, reflects strong motivation and adherence to the treatment plan. Continued use of mirror therapy and fine motor activities will support further neural reeducation and functional gains.    Goals (to be met in 12 visits)   Pt will improve safety awareness and judgement to promote independence in the home and community.    Pt will increase endurance and activity tolerance to 30 minutes with no breaks to support participation in ADL/IADL.    Pt will be independent and compliant with comprehensive HEP to maintain progress achieved in OT.   Pt will improve sustained attention by independently completing a familiar task for 30 minutes with no breaks to support independence and success in activities like leisure and social participation.               Plan  Patient will be seen 1-2 x/week or a total of 12 visits over a  90 day period. Treatment will include: Manual Therapy; Neuromuscular Re-education; Therapeutic Activities; Self-Care Home Management; Therapeutic Exercise; Home Exercise Program instruction; Electrical stimulation (attended); Ultrasound    Treatment Last 4 Visits        6/16/2025 6/18/2025 6/23/2025 6/25/2025   OT Treatment   Treatment Day 2 3  4   Therapeutic Exercise Shoulder ROM Shoulder AAROM using dowel   Therapy wheel shoulder wrist and elbow (bilateral        Neuro Re-Educ   Mirror therapy: RUE blocked from vision with LUE completing:  - full hand grasp   - sponge  (finger adduction + fist) Mirror therapy: RUE blocked from vision with LUE completing:  - full hand grasp   - sponge  (finger adduction + fist)  - marble sorting    Therapeutic Activity blink Perfection      Manual Therapy Passive ROM: elbow flex/ext ; shoulder flex/ext/shoulder abd; wrist flex/ext/uln dev/ rad dev Passive ROM: elbow flex/ext ; shoulder flex/ext/shoulder abd; wrist flex/ext/uln dev/ rad dev Passive ROM: elbow flex/ext ; shoulder flex/ext/shoulder abd; wrist flex/ext/uln dev/ rad dev Passive ROM: elbow flex/ext ; shoulder flex/ext/shoulder abd; wrist flex/ext/uln dev/ rad dev   Neuro Re-Ed Min   35 30   Therapeutic Exercise Min 10 15     Ther Activity Min 20 15     Manual Therapy Min 15 15 10 15   Total Timed Procedures 45 45 45 45   Total Service Procedures 45 45 45 45   Total Time 45 45 45 45         HEP  Cognitive Recovery strategies:  W.R.A.P  Daily orientiation  Shoulder ROM   Elbow ROM   Wrist PROM    Charges     NRE-EDx2, MTx1

## 2025-06-30 ENCOUNTER — OFFICE VISIT (OUTPATIENT)
Dept: OCCUPATIONAL MEDICINE | Facility: HOSPITAL | Age: 70
End: 2025-06-30
Attending: Other
Payer: MEDICARE

## 2025-06-30 ENCOUNTER — OFFICE VISIT (OUTPATIENT)
Dept: PHYSICAL THERAPY | Facility: HOSPITAL | Age: 70
End: 2025-06-30
Attending: Other
Payer: MEDICARE

## 2025-06-30 PROCEDURE — 97110 THERAPEUTIC EXERCISES: CPT

## 2025-06-30 PROCEDURE — 97140 MANUAL THERAPY 1/> REGIONS: CPT

## 2025-06-30 PROCEDURE — 97112 NEUROMUSCULAR REEDUCATION: CPT

## 2025-06-30 NOTE — PROGRESS NOTES
Patient: Federica Crowell (70 year old, female) Referring Provider:  Insurance:   Diagnosis: Status post CVA (Z86.73) Issa Le  MEDICARE   Date of Surgery: No data recorded Next MD visit:  [facility]   Precautions:  Fall Risk No data recorded Referral Information:    Date of Evaluation: Req: 0, Auth: 0, Exp:     06/12/25 POC Auth Visits:  10       Today's Date   6/30/2025    Subjective  Pt is doing well today no changes to report       Pain: pain not reported     Objective  See tx flow sheet          Assessment  Pt has good participation in session. Continued with activities to facilitate increased weight shifting through RLE. Pt requires tactile and verbal cues throughout single leg stepping over sabrina to increase R WS. Improved ability to complete with side steps versus forward stepping. Tactile cue required to complete LAQ without hip flexion compensation. Due to ongoing limitations, pt would benefit from continued skilled intervention to achieve goals set.    Goals (to be met in 10 visits)      Not Met Progress Toward Partially Met Met   Pt will demonstrate improved knee extension strength to 4/5 to improve ability to rise from chair with equal weight shift to avoid excess strain on LLE.  [] [x] [] []   Pt will perform TUG in <50 seconds with least restrictive AD, demonstrating improved gait speed for community ambulation. [] [x] [] []   Pt will perform Frank with score of 38/56 or greater to reduce risk for falls. [] [x] [] []   Pt will display improved gait mechanics with equal stance time and step length to improve gait speed for community ambulation.  [] [x] [] []   Pt will be independent and compliant with comprehensive HEP to maintain progress achieved in PT. [] [x] [] []                             Plan  Continue per plan of care.    Treatment Last 4 Visits  Treatment Day: 6       6/18/2025 6/23/2025 6/25/2025 6/30/2025   Neuro Treatment   Therapeutic Exercise NuStep, level 4, 6 min (band around  knees to reduce RLE abduction)   Side stepping in // bars, x2 lengths   LAQ, 2x10 (therapist assist to reduce hip flexion)    Walking in gym with cane, multiple bouts during therapy NuStep, level 4, 5 min   Seated R hip flexion over weight, min assist from LUE for hip flexion  Side stepping in // bars, x1 lengths   Standing HS curls RLE, x10 (therapist assisting)   NuStep, level 4, 6 min   Shuttle press, DL, #50, 3x10  SLP, #25, x15 bilat  Review of current ex for home   NuStep, level 5, 6 min   Walking with cane in therapy gym, multiple bouts  LAQ, RLE, 2x10   Side stepping, 3 laps (1/2 length of bars)        Neuro Re-Education Fwd/retro walk in // bars, 1 UE, 2 laps   Marching, 1 UE support, therapist assist for R hip flexion, 2x10 bilat  Staggered STS with hemiparetic side back, 3x5 w/ UE support, therapist blocking at R knee and ankle  Standing on airex, no UE support, 3x20 sec  Standing on airex, head turns and nods, x10 each, light UE   Marching, 1 UE support, 2x10 bilat   Staggered STS with hemiparetic side back, 2x5 w/ UE support, therapist blocking at R knee and ankle   Fwd step with light UE to dot on floor, x10 bilat, therapist blocking RLE as needed    Multiple STS transfers from WC to Nustep and stand to supine to shuttle, CGA-Min A for LE placement  Single leg tapping over sabrina, x10 bilat, therapist assisting with RLE hip flexion   Lat step over sabrina, LLE tapping, x10   Standing w/ self selected STACY, head turns and nods, 2x10 each  *Standing and seated rest for recovery    4 in step up, RLE only, therapist assisting with stability, to work on weight shifting and quad retraining, 2x8  Standing w/ self selected STACY, head turns and nods, 2x10 each (second set on airex)  Single leg tapping over sabrina, 2x10, LLE tapping, therapist providing TC for equal weight shift  Lat step over sabrina, LLE tapping, x10   *Standing and seated rest for recovery      Gait Training  Ambulating in clinic with small base  quad cane, VC for step length   Fwd/retro walk in // bars, 1 UE, 2 laps (emphasis on equal step length bilat)  Walking in agility ladder, 2 laps, one foot per square     Therapeutic Exercise Minutes 18 15 15 15   Neuro Re-Educ Minutes 25 15 25 25   Gait Training Minutes  10     Total Time Of Timed Procedures 43 40 40 40   Total Time Of Service-Based Procedures 0 0 0 0   Total Treatment Time 43 40 40 40        HEP       Charges  NR 2 TE 1

## 2025-06-30 NOTE — PROGRESS NOTES
Patient: Federica Crowell (70 year old, female) Referring Provider:  Insurance:   Diagnosis: Status post CVA (Z86.73) Issa Itharry  MEDICARE   Date of Surgery: No data recorded Next MD visit:  [facility]   Precautions:  Fall Risk n/a Referral Information:   Date of Injury: No data recorded Date of Evaluation: Req: 0, Auth: 0, Exp:     06/12/25 POC Auth Visits:          Today's Date   6/30/2025    Subjective  Patient presents to OT noting that she received her mirror for home-based mirror therapy.       Pain: 0/10     Objective  Patient participated in PROM activities followed by ROM exercises using the mirror during the session.           Assessment  Patient is demonstrating good progress and strong motivation, as evidenced by acquiring her own mirror and bringing it to the session to learn how to properly implement mirror therapy at home. Overall, she is doing well and continues to engage actively in her rehabilitation.    Goals (to be met in 12 visits)   Pt will improve safety awareness and judgement to promote independence in the home and community.    Pt will increase endurance and activity tolerance to 30 minutes with no breaks to support participation in ADL/IADL.    Pt will be independent and compliant with comprehensive HEP to maintain progress achieved in OT.   Pt will improve sustained attention by independently completing a familiar task for 30 minutes with no breaks to support independence and success in activities like leisure and social participation.                 Plan  Patient will be seen 1-2 x/week or a total of 12 visits over a 90 day period. Treatment will include: Manual Therapy; Neuromuscular Re-education; Therapeutic Activities; Self-Care Home Management; Therapeutic Exercise; Home Exercise Program instruction; Electrical stimulation (attended); Ultrasound    Treatment Last 4 Visits        6/18/2025 6/23/2025 6/25/2025 6/30/2025   OT Treatment   Treatment Day 3  4 5   Therapeutic Exercise  Shoulder AAROM using dowel   Therapy wheel shoulder wrist and elbow (bilateral         Neuro Re-Educ  Mirror therapy: RUE blocked from vision with LUE completing:  - full hand grasp   - sponge  (finger adduction + fist) Mirror therapy: RUE blocked from vision with LUE completing:  - full hand grasp   - sponge  (finger adduction + fist)  - marble sorting  Mirror therapy: RUE blocked from vision with LUE completing:  - full hand grasp   - sponge  (finger adduction + fist)  - marble sorting    Therapeutic Activity Perfection       Manual Therapy Passive ROM: elbow flex/ext ; shoulder flex/ext/shoulder abd; wrist flex/ext/uln dev/ rad dev Passive ROM: elbow flex/ext ; shoulder flex/ext/shoulder abd; wrist flex/ext/uln dev/ rad dev Passive ROM: elbow flex/ext ; shoulder flex/ext/shoulder abd; wrist flex/ext/uln dev/ rad dev Passive ROM: elbow flex/ext ; shoulder flex/ext/shoulder abd; wrist flex/ext/uln dev/ radial dev   Neuro Re-Ed Min  35 30    Therapeutic Exercise Min 15   25   Ther Activity Min 15      Manual Therapy Min 15 10 15 10   Total Timed Procedures 45 45 45 35   Total Service Procedures 45 45 45 35   Total Time 45 45 45 35         HEP  Cognitive Recovery strategies:  W.R.A.P  Daily orientiation  Shoulder ROM   Elbow ROM   Wrist PROM    Charges     TEx1, MTx1

## 2025-07-02 ENCOUNTER — OFFICE VISIT (OUTPATIENT)
Dept: OCCUPATIONAL MEDICINE | Facility: HOSPITAL | Age: 70
End: 2025-07-02
Attending: Other
Payer: MEDICARE

## 2025-07-02 ENCOUNTER — OFFICE VISIT (OUTPATIENT)
Dept: PHYSICAL THERAPY | Facility: HOSPITAL | Age: 70
End: 2025-07-02
Attending: Other
Payer: MEDICARE

## 2025-07-02 PROCEDURE — 97110 THERAPEUTIC EXERCISES: CPT

## 2025-07-02 PROCEDURE — 97112 NEUROMUSCULAR REEDUCATION: CPT

## 2025-07-02 PROCEDURE — 97140 MANUAL THERAPY 1/> REGIONS: CPT

## 2025-07-02 NOTE — PROGRESS NOTES
Patient: Federica Crowell (70 year old, female) Referring Provider:  Insurance:   Diagnosis: Status post CVA (Z86.73) Issa Le  MEDICARE   Date of Surgery: No data recorded Next MD visit:  [facility]   Precautions:  Fall Risk No data recorded Referral Information:    Date of Evaluation: Req: 0, Auth: 0, Exp:     06/12/25 POC Auth Visits:  10       Today's Date   7/2/2025    Subjective  Pt reports she is well today, little bit more tired.       Pain: pain not reported     Objective  Pt requires increased stability at knee from therapist to complete step ups on RLE.       Assessment  Pt requires increased rest throughout session secondary to fatigue. Pt able to complete shuttle press for quad strengthening, min cues for full TKE bilaterally needed. Increased assist needed from therapist with 4 in step ups on RLE today, utilizes heavy UE support and L shift throughout. Good balance with mini lunges onto airex bilat, light UE support for stability.    Goals (to be met in 10 visits)      Not Met Progress Toward Partially Met Met   Pt will demonstrate improved knee extension strength to 4/5 to improve ability to rise from chair with equal weight shift to avoid excess strain on LLE.  [] [x] [] []   Pt will perform TUG in <50 seconds with least restrictive AD, demonstrating improved gait speed for community ambulation. [] [x] [] []   Pt will perform Frank with score of 38/56 or greater to reduce risk for falls. [] [x] [] []   Pt will display improved gait mechanics with equal stance time and step length to improve gait speed for community ambulation.  [] [x] [] []   Pt will be independent and compliant with comprehensive HEP to maintain progress achieved in PT. [] [x] [] []                                 Plan  Continue per plan of care.    Treatment Last 4 Visits  Treatment Day: 7 6/23/2025 6/25/2025 6/30/2025 7/2/2025   Neuro Treatment   Therapeutic Exercise NuStep, level 4, 5 min   Seated R hip flexion over  weight, min assist from LUE for hip flexion  Side stepping in // bars, x1 lengths   Standing HS curls RLE, x10 (therapist assisting)   NuStep, level 4, 6 min   Shuttle press, DL, #50, 3x10  SLP, #25, x15 bilat  Review of current ex for home   NuStep, level 5, 6 min   Walking with cane in therapy gym, multiple bouts  LAQ, RLE, 2x10   Side stepping, 3 laps (1/2 length of bars)      NuStep, level 5, 6 min   DL, #50, 3x10   SLP, #25, x15 bilat   LAQ, RLE, 2x10        Neuro Re-Education Marching, 1 UE support, 2x10 bilat   Staggered STS with hemiparetic side back, 2x5 w/ UE support, therapist blocking at R knee and ankle   Fwd step with light UE to dot on floor, x10 bilat, therapist blocking RLE as needed    Multiple STS transfers from WC to Nustep and stand to supine to shuttle, CGA-Min A for LE placement  Single leg tapping over sabrina, x10 bilat, therapist assisting with RLE hip flexion   Lat step over sabrina, LLE tapping, x10   Standing w/ self selected STACY, head turns and nods, 2x10 each  *Standing and seated rest for recovery    4 in step up, RLE only, therapist assisting with stability, to work on weight shifting and quad retraining, 2x8  Standing w/ self selected STACY, head turns and nods, 2x10 each (second set on airex)  Single leg tapping over sabrina, 2x10, LLE tapping, therapist providing TC for equal weight shift  Lat step over sabrina, LLE tapping, x10   *Standing and seated rest for recovery    Mini lunge onto airex, x10 bilat  Standing on airex, head turns, 2x10 bilat   SLS, 3x10 sec bilat, light UE   4 in step up, RLE only, therapist assisting with stability, to work on weight shifting and quad retraining, 2x8   *Standing and seated rest for recovery      Gait Training Ambulating in clinic with small base quad cane, VC for step length   Fwd/retro walk in // bars, 1 UE, 2 laps (emphasis on equal step length bilat)  Walking in agilHighlight ladder, 2 laps, one foot per square      Therapeutic Exercise Minutes 15 15  15 25   Neuro Re-Educ Minutes 15 25 25 15   Gait Training Minutes 10      Total Time Of Timed Procedures 40 40 40 40   Total Time Of Service-Based Procedures 0 0 0 0   Total Treatment Time 40 40 40 40        HEP       Charges  TE 2 NR 1

## 2025-07-02 NOTE — PROGRESS NOTES
Patient: Federica Crowell (70 year old, female) Referring Provider:  Insurance:   Diagnosis: Status post CVA (Z86.73) Issa Mimi  MEDICARE   Date of Surgery: No data recorded Next MD visit:  [facility]   Precautions:  Fall Risk n/a Referral Information:   Date of Injury: No data recorded Date of Evaluation: Req: 0, Auth: 0, Exp:     06/12/25 POC Auth Visits:          Today's Date   7/2/2025    Subjective  Patient presents to OT reporting that she has been completing mirror therapy exercises at home       Pain: 0/10     Objective  Minimal flexion noted in the 5th digit of the right hand during mirror therapy, particularly when she performs grasp and release tasks with the left hand.           Assessment  Patient was educated on the importance of building confidence in using the left (non-affected) arm independently while continuing to work toward functional recovery of the right hand. One-handed strategies for dressing and grooming will be incorporated to promote functional independence and increase comfort with unilateral task completion.    Goals (to be met in 12 visits)   Pt will improve safety awareness and judgement to promote independence in the home and community.    Pt will increase endurance and activity tolerance to 30 minutes with no breaks to support participation in ADL/IADL.    Pt will be independent and compliant with comprehensive HEP to maintain progress achieved in OT.   Pt will improve sustained attention by independently completing a familiar task for 30 minutes with no breaks to support independence and success in activities like leisure and social participation.                   Plan  Patient will be seen 1-2 x/week or a total of 12 visits over a 90 day period. Treatment will include: Manual Therapy; Neuromuscular Re-education; Therapeutic Activities; Self-Care Home Management; Therapeutic Exercise; Home Exercise Program instruction; Electrical stimulation (attended);  Ultrasound    Treatment Last 4 Visits        6/23/2025 6/25/2025 6/30/2025 7/2/2025   OT Treatment   Treatment Day  4 5    Neuro Re-Educ Mirror therapy: RUE blocked from vision with LUE completing:  - full hand grasp   - sponge  (finger adduction + fist) Mirror therapy: RUE blocked from vision with LUE completing:  - full hand grasp   - sponge  (finger adduction + fist)  - marble sorting  Mirror therapy: RUE blocked from vision with LUE completing:  - full hand grasp   - sponge  (finger adduction + fist)  - marble sorting  Mirror therapy: RUE blocked from vision with LUE completing:  - full hand grasp   - sponge  (finger adduction + fist)  - marble sorting    Manual Therapy Passive ROM: elbow flex/ext ; shoulder flex/ext/shoulder abd; wrist flex/ext/uln dev/ rad dev Passive ROM: elbow flex/ext ; shoulder flex/ext/shoulder abd; wrist flex/ext/uln dev/ rad dev Passive ROM: elbow flex/ext ; shoulder flex/ext/shoulder abd; wrist flex/ext/uln dev/ radial dev Passive ROM: elbow flex/ext ; shoulder flex/ext/shoulder abd; wrist flex/ext/uln dev/ radial dev   Neuro Re-Ed Min 35 30     Therapeutic Exercise Min   25 35   Manual Therapy Min 10 15 10 10   Total Timed Procedures 45 45 35 45   Total Service Procedures 45 45 35 45   Total Time 45 45 35 45         HEP  Cognitive Recovery strategies:  W.R.A.P  Daily orientiation  Shoulder ROM   Elbow ROM   Wrist PROM    Charges     TEx2, MTx1

## 2025-07-07 ENCOUNTER — APPOINTMENT (OUTPATIENT)
Dept: PHYSICAL THERAPY | Facility: HOSPITAL | Age: 70
End: 2025-07-07
Attending: Other
Payer: MEDICARE

## 2025-07-07 ENCOUNTER — APPOINTMENT (OUTPATIENT)
Dept: OCCUPATIONAL MEDICINE | Facility: HOSPITAL | Age: 70
End: 2025-07-07
Attending: Other
Payer: MEDICARE

## 2025-07-09 ENCOUNTER — OFFICE VISIT (OUTPATIENT)
Dept: OCCUPATIONAL MEDICINE | Facility: HOSPITAL | Age: 70
End: 2025-07-09
Attending: Other
Payer: MEDICARE

## 2025-07-09 ENCOUNTER — OFFICE VISIT (OUTPATIENT)
Dept: PHYSICAL THERAPY | Facility: HOSPITAL | Age: 70
End: 2025-07-09
Attending: Other
Payer: MEDICARE

## 2025-07-09 PROCEDURE — 97140 MANUAL THERAPY 1/> REGIONS: CPT

## 2025-07-09 PROCEDURE — 97110 THERAPEUTIC EXERCISES: CPT

## 2025-07-09 PROCEDURE — 97112 NEUROMUSCULAR REEDUCATION: CPT

## 2025-07-09 NOTE — PROGRESS NOTES
Patient: Federica Crowell (70 year old, female) Referring Provider:  Insurance:   Diagnosis: Status post CVA (Z86.73) Issa Le  MEDICARE   Date of Surgery: No data recorded Next MD visit:  [facility]   Precautions:  Fall Risk No data recorded Referral Information:    Date of Evaluation: Req: 0, Auth: 0, Exp:     06/12/25 POC Auth Visits:  10       Today's Date   7/9/2025    Subjective  Doing well today, saw PCP on Monday. No changes to report but she feels like she is walking faster       Pain: pain not reported     Objective  See tx flow sheet; HEP assigned          Assessment  Pt assigned new HEP. Went through each exercise with min cueing from therapist as needed, 1 standing and 1 seated rest break throughout exercises needed. Pt education on using WC at kitchen counter for safety in case she needs to sit and rest. Pt and spouse education provided today on remainder of PT, they are agreeable to finishing PT next week and looking into medical fitness program (handout provided). Recommended following d/c, pt continue with exercise either at home or at gym for at least 6 months before considering return to PT. Criteria explained for need to return to skilled therapy such as change in mobility status, injury or new goals that PT needs to address. Pt and spouse verbalize understanding.    Goals (to be met in 10 visits)      Not Met Progress Toward Partially Met Met   Pt will demonstrate improved knee extension strength to 4/5 to improve ability to rise from chair with equal weight shift to avoid excess strain on LLE.  [] [x] [] []   Pt will perform TUG in <50 seconds with least restrictive AD, demonstrating improved gait speed for community ambulation. [] [x] [] []   Pt will perform Frank with score of 38/56 or greater to reduce risk for falls. [] [x] [] []   Pt will display improved gait mechanics with equal stance time and step length to improve gait speed for community ambulation.  [] [x] [] []   Pt will be  independent and compliant with comprehensive HEP to maintain progress achieved in PT. [] [x] [] []                                     Plan  Continue per plan of care.    Treatment Last 4 Visits  Treatment Day: 8 6/25/2025 6/30/2025 7/2/2025 7/9/2025   Neuro Treatment   Therapeutic Exercise NuStep, level 4, 6 min   Shuttle press, DL, #50, 3x10  SLP, #25, x15 bilat  Review of current ex for home   NuStep, level 5, 6 min   Walking with cane in therapy gym, multiple bouts  LAQ, RLE, 2x10   Side stepping, 3 laps (1/2 length of bars)      NuStep, level 5, 6 min   DL, #50, 3x10   SLP, #25, x15 bilat   LAQ, RLE, 2x10      NuStep, level 5, 6 min   New HEP  -Hip abduction 1 UE, 2x10 bilat  -Hip extension 1 UE, 2x10 bilat  Pt and spouse education: HEP for home to complete daily independently, plan for upcoming discharge for PT. Following PT, look into medical fitness program (handout provided) for continued strengthening. Take at least 6 months to continue to exercise independently, then if new PT goals or mobility concerns come up can return. Possible annual PT tune up as needed.          Neuro Re-Education Multiple STS transfers from WC to Nustep and stand to supine to shuttle, CGA-Min A for LE placement  Single leg tapping over sabrina, x10 bilat, therapist assisting with RLE hip flexion   Lat step over sabrina, LLE tapping, x10   Standing w/ self selected STACY, head turns and nods, 2x10 each  *Standing and seated rest for recovery    4 in step up, RLE only, therapist assisting with stability, to work on weight shifting and quad retraining, 2x8  Standing w/ self selected STACY, head turns and nods, 2x10 each (second set on airex)  Single leg tapping over sabrina, 2x10, LLE tapping, therapist providing TC for equal weight shift  Lat step over sabrina, LLE tapping, x10   *Standing and seated rest for recovery    Mini lunge onto airex, x10 bilat  Standing on airex, head turns, 2x10 bilat   SLS, 3x10 sec bilat, light UE   4 in  step up, RLE only, therapist assisting with stability, to work on weight shifting and quad retraining, 2x8   *Standing and seated rest for recovery    New HEP:  -Marching, 1 UE, 2x10  -Single leg balance 3x10 sec bilat 1 UE  -Weight shifting 1 UE, 2x10   *Standing and seated rest for recovery   NB, light UE, head turns and nods, 2x10 each way     Therapeutic Exercise Minutes 15 15 25 25   Neuro Re-Educ Minutes 25 25 15 19   Total Time Of Timed Procedures 40 40 40 44   Total Time Of Service-Based Procedures 0 0 0 0   Total Treatment Time 40 40 40 44   HEP    Access Code: [email or web address]  URL: [email or web address]  Date: 07/09/2025  Prepared by: Olena Welchin    Exercises  - Standing March with Counter Support  - 1 x daily - 7 x weekly - 2 sets - 10 reps  - Standing Hip Abduction with Counter Support  - 1 x daily - 7 x weekly - 2 sets - 10 reps  - Standing Hip Extension with Counter Support  - 1 x daily - 7 x weekly - 2 sets - 10 reps  - Standing Single Leg Stance with Counter Support  - 1 x daily - 7 x weekly - 1 sets - 5 reps - 10 sec hold  - Side to Side Weight Shift with Counter Support  - 1 x daily - 7 x weekly - 2 sets - 10 reps        HEP  Access Code: [email or web address]  URL: [email or web address]  Date: 07/09/2025  Prepared by: Olena Hedin    Exercises  - Standing March with Counter Support  - 1 x daily - 7 x weekly - 2 sets - 10 reps  - Standing Hip Abduction with Counter Support  - 1 x daily - 7 x weekly - 2 sets - 10 reps  - Standing Hip Extension with Counter Support  - 1 x daily - 7 x weekly - 2 sets - 10 reps  - Standing Single Leg Stance with Counter Support  - 1 x daily - 7 x weekly - 1 sets - 5 reps - 10 sec hold  - Side to Side Weight Shift with Counter Support  - 1 x daily - 7 x weekly - 2 sets - 10 reps    Charges  TE 2 NR 1

## 2025-07-09 NOTE — PROGRESS NOTES
Patient: Federica Crowell (70 year old, female) Referring Provider:  Insurance:   Diagnosis: Status post CVA (Z86.73) Issa Itharry  MEDICARE   Date of Surgery: No data recorded Next MD visit:  [facility]   Precautions:  Fall Risk n/a Referral Information:   Date of Injury: No data recorded Date of Evaluation: Req: 0, Auth: 0, Exp:     06/12/25 POC Auth Visits:          Today's Date   7/9/2025    Subjective  Patient presents to OT expressing some frustration and noting that she is beginning to accept the current level of function in the RUE.       Pain: 0/10     Objective  Patient engaged in ROM exercises using mirror therapy, followed by PROM stretching to the RUE.      Assessment  The patient’s expression of frustration reflects an increased awareness and insight into her condition and the potential limitations of recovery, which is an important step in the adjustment process. Minimal finger response is observed during mirror therapy; however, her continued motivation and engagement in therapy are positive indicators for functional adaptation and progress.    Goals (to be met in 12 visits)   Pt will improve safety awareness and judgement to promote independence in the home and community.    Pt will increase endurance and activity tolerance to 30 minutes with no breaks to support participation in ADL/IADL.    Pt will be independent and compliant with comprehensive HEP to maintain progress achieved in OT.   Pt will improve sustained attention by independently completing a familiar task for 30 minutes with no breaks to support independence and success in activities like leisure and social participation.         Plan  Patient will be seen 1-2 x/week or a total of 12 visits over a 90 day period. Treatment will include: Manual Therapy; Neuromuscular Re-education; Therapeutic Activities; Self-Care Home Management; Therapeutic Exercise; Home Exercise Program instruction; Electrical stimulation (attended);  Ultrasound    Treatment Last 4 Visits        6/25/2025 6/30/2025 7/2/2025 7/9/2025   OT Treatment   Treatment Day 4 5  7   Neuro Re-Educ Mirror therapy: RUE blocked from vision with LUE completing:  - full hand grasp   - sponge  (finger adduction + fist)  - marble sorting  Mirror therapy: RUE blocked from vision with LUE completing:  - full hand grasp   - sponge  (finger adduction + fist)  - marble sorting  Mirror therapy: RUE blocked from vision with LUE completing:  - full hand grasp   - sponge  (finger adduction + fist)  - marble sorting  Mirror therapy: RUE blocked from vision with LUE completing:  - full hand grasp   - sponge  (finger adduction + fist)  - marble sorting    Manual Therapy Passive ROM: elbow flex/ext ; shoulder flex/ext/shoulder abd; wrist flex/ext/uln dev/ rad dev Passive ROM: elbow flex/ext ; shoulder flex/ext/shoulder abd; wrist flex/ext/uln dev/ radial dev Passive ROM: elbow flex/ext ; shoulder flex/ext/shoulder abd; wrist flex/ext/uln dev/ radial dev Passive ROM: elbow flex/ext ; shoulder flex/ext/shoulder abd; wrist flex/ext/uln dev/ radial dev   Neuro Re-Ed Min 30      Therapeutic Exercise Min  25 35 35   Manual Therapy Min 15 10 10 10   Total Timed Procedures 45 35 45 45   Total Service Procedures 45 35 45 45   Total Time 45 35 45 45         HEP  Cognitive Recovery strategies:  W.R.A.P  Daily orientiation  Shoulder ROM   Elbow ROM   Wrist PROM    Charges     TEx2, MTx1

## 2025-07-14 ENCOUNTER — OFFICE VISIT (OUTPATIENT)
Dept: OCCUPATIONAL MEDICINE | Facility: HOSPITAL | Age: 70
End: 2025-07-14
Attending: Other
Payer: MEDICARE

## 2025-07-14 ENCOUNTER — OFFICE VISIT (OUTPATIENT)
Dept: PHYSICAL THERAPY | Facility: HOSPITAL | Age: 70
End: 2025-07-14
Attending: Other
Payer: MEDICARE

## 2025-07-14 PROCEDURE — 97110 THERAPEUTIC EXERCISES: CPT

## 2025-07-14 PROCEDURE — 97140 MANUAL THERAPY 1/> REGIONS: CPT

## 2025-07-14 PROCEDURE — 97112 NEUROMUSCULAR REEDUCATION: CPT

## 2025-07-14 NOTE — PROGRESS NOTES
Patient: Federica Crowell (70 year old, female) Referring Provider:  Insurance:   Diagnosis: Status post CVA (Z86.73) Issa Itharry  MEDICARE   Date of Surgery: No data recorded Next MD visit:  [facility]   Precautions:  Fall Risk No data recorded Referral Information:    Date of Evaluation: Req: 0, Auth: 0, Exp:     06/12/25 POC Auth Visits:  10       Today's Date   7/14/2025    Subjective  Pt reports she did try some of the HEP for home over the weekend.       Pain: pain not reported     Objective  See tx flow sheet; less assist needed on SLP for RLE TKE and reducing excess varus force.          Assessment  Pt able to continue with exercises with seated and standing rest breaks. During balance activities, encouraged pt to attempt open palm support to reduce reliance on UE for weight shifting and balance. Pt tends to rest arm on bar instead, VC to lessen arm support as tolerated to improve single limb stance time bilaterally. With shuttle press, pt needs less tactile cuing to decrease varus force on RLE. Next session, d/c planning.    Goals (to be met in 10 visits)      Not Met Progress Toward Partially Met Met   Pt will demonstrate improved knee extension strength to 4/5 to improve ability to rise from chair with equal weight shift to avoid excess strain on LLE.  [] [x] [] []   Pt will perform TUG in <50 seconds with least restrictive AD, demonstrating improved gait speed for community ambulation. [] [x] [] []   Pt will perform Frank with score of 38/56 or greater to reduce risk for falls. [] [x] [] []   Pt will display improved gait mechanics with equal stance time and step length to improve gait speed for community ambulation.  [] [x] [] []   Pt will be independent and compliant with comprehensive HEP to maintain progress achieved in PT. [] [x] [] []                                         Plan  Continue per plan of care. Next session; discharge.    Treatment Last 4 Visits  Treatment Day: 9       6/30/2025  7/2/2025 7/9/2025 7/14/2025   Neuro Treatment   Therapeutic Exercise NuStep, level 5, 6 min   Walking with cane in therapy gym, multiple bouts  LAQ, RLE, 2x10   Side stepping, 3 laps (1/2 length of bars)      NuStep, level 5, 6 min   DL, #50, 3x10   SLP, #25, x15 bilat   LAQ, RLE, 2x10      NuStep, level 5, 6 min   New HEP  -Hip abduction 1 UE, 2x10 bilat  -Hip extension 1 UE, 2x10 bilat  Pt and spouse education: HEP for home to complete daily independently, plan for upcoming discharge for PT. Following PT, look into medical fitness program (handout provided) for continued strengthening. Take at least 6 months to continue to exercise independently, then if new PT goals or mobility concerns come up can return. Possible annual PT tune up as needed.        NuStep, level 5, 6 min   LAQ w/ 1# weights, 2x10   DL, #50, 3x10   SLP, #25, x15 bilat   HS curl w/ blue band, 2x10 LLE, x10 RLE with assist of LLE     Neuro Re-Education 4 in step up, RLE only, therapist assisting with stability, to work on weight shifting and quad retraining, 2x8  Standing w/ self selected STACY, head turns and nods, 2x10 each (second set on airex)  Single leg tapping over sabrina, 2x10, LLE tapping, therapist providing TC for equal weight shift  Lat step over sabrina, LLE tapping, x10   *Standing and seated rest for recovery    Mini lunge onto airex, x10 bilat  Standing on airex, head turns, 2x10 bilat   SLS, 3x10 sec bilat, light UE   4 in step up, RLE only, therapist assisting with stability, to work on weight shifting and quad retraining, 2x8   *Standing and seated rest for recovery    New HEP:  -Marching, 1 UE, 2x10  -Single leg balance 3x10 sec bilat 1 UE  -Weight shifting 1 UE, 2x10   *Standing and seated rest for recovery   NB, light UE, head turns and nods, 2x10 each way   Alternating taps to pods in semi [place], 4 laps  LLE tapping pods in semi [place], RLE in stance, x5  NB, light UE, head turns and nods, x10 each way  Stepping to random call  out color dot on floor, multiple bouts, light UE as able   Therapeutic Exercise Minutes 15 25 25 25   Neuro Re-Educ Minutes 25 15 19 13   Total Time Of Timed Procedures 40 40 44 38   Total Time Of Service-Based Procedures 0 0 0 0   Total Treatment Time 40 40 44 38   HEP   Access Code: [email or web address]  URL: [email or web address]  Date: 07/09/2025  Prepared by: Olena Hedin    Exercises  - Standing March with Counter Support  - 1 x daily - 7 x weekly - 2 sets - 10 reps  - Standing Hip Abduction with Counter Support  - 1 x daily - 7 x weekly - 2 sets - 10 reps  - Standing Hip Extension with Counter Support  - 1 x daily - 7 x weekly - 2 sets - 10 reps  - Standing Single Leg Stance with Counter Support  - 1 x daily - 7 x weekly - 1 sets - 5 reps - 10 sec hold  - Side to Side Weight Shift with Counter Support  - 1 x daily - 7 x weekly - 2 sets - 10 reps         HEP  Access Code: [email or web address]  URL: [email or web address]  Date: 07/09/2025  Prepared by: Olena Hedin    Exercises  - Standing March with Counter Support  - 1 x daily - 7 x weekly - 2 sets - 10 reps  - Standing Hip Abduction with Counter Support  - 1 x daily - 7 x weekly - 2 sets - 10 reps  - Standing Hip Extension with Counter Support  - 1 x daily - 7 x weekly - 2 sets - 10 reps  - Standing Single Leg Stance with Counter Support  - 1 x daily - 7 x weekly - 1 sets - 5 reps - 10 sec hold  - Side to Side Weight Shift with Counter Support  - 1 x daily - 7 x weekly - 2 sets - 10 reps    Charges  TE 2 NR 1

## 2025-07-14 NOTE — PROGRESS NOTES
Patient: Federica Crowell (70 year old, female) Referring Provider:  Insurance:   Diagnosis: Status post CVA (Z86.73) Issa Mimi  MEDICARE   Date of Surgery: No data recorded Next MD visit:  [facility]   Precautions:  Fall Risk n/a Referral Information:   Date of Injury: No data recorded Date of Evaluation: Req: 0, Auth: 0, Exp:     06/12/25 POC Auth Visits:          Today's Date   7/14/2025    Subjective  Patient presents to OT noting that she is becoming more accepting of her current level of function, and it is now causing her less frustration and distress.       Pain: 0/10     Objective  Patient engaged in ROM exercises, followed by dexterity and fine motor tasks using mirror therapy.      Assessment  The patient’s increased acceptance of her current functional level is a positive shift, allowing her to redirect her focus toward maximizing strength and function in the LUE. This mindset supports improved engagement in one-handed tasks and overall functional independence. She was encouraged to continue high-repetition practice at home, as repetition is key for promoting neuroplasticity and regaining small movements in the R hand.    Goals (to be met in 12 visits)   Pt will improve safety awareness and judgement to promote independence in the home and community.    Pt will increase endurance and activity tolerance to 30 minutes with no breaks to support participation in ADL/IADL.    Pt will be independent and compliant with comprehensive HEP to maintain progress achieved in OT.   Pt will improve sustained attention by independently completing a familiar task for 30 minutes with no breaks to support independence and success in activities like leisure and social participation.           Plan  Patient will be seen 1-2 x/week or a total of 12 visits over a 90 day period. Treatment will include: Manual Therapy; Neuromuscular Re-education; Therapeutic Activities; Self-Care Home Management; Therapeutic Exercise; Home  Exercise Program instruction; Electrical stimulation (attended); Ultrasound    Treatment Last 4 Visits        6/30/2025 7/2/2025 7/9/2025 7/14/2025   OT Treatment   Treatment Day 5  7 8   Therapeutic Exercise    -LUE yellow flexbar (pronationsupinationflexionextension)    Neuro Re-Educ Mirror therapy: RUE blocked from vision with LUE completing:  - full hand grasp   - sponge  (finger adduction + fist)  - marble sorting  Mirror therapy: RUE blocked from vision with LUE completing:  - full hand grasp   - sponge  (finger adduction + fist)  - marble sorting  Mirror therapy: RUE blocked from vision with LUE completing:  - full hand grasp   - sponge  (finger adduction + fist)  - marble sorting  Mirror therapy: RUE blocked from vision with LUE completing:  - full hand grasp   - sponge  (finger adduction + fist)  - marble sorting    Manual Therapy Passive ROM: elbow flex/ext ; shoulder flex/ext/shoulder abd; wrist flex/ext/uln dev/ radial dev Passive ROM: elbow flex/ext ; shoulder flex/ext/shoulder abd; wrist flex/ext/uln dev/ radial dev Passive ROM: elbow flex/ext ; shoulder flex/ext/shoulder abd; wrist flex/ext/uln dev/ radial dev Passive ROM: elbow flex/ext ; shoulder flex/ext/shoulder abd; wrist flex/ext/uln dev/ radial dev   Neuro Re-Ed Min    20   Therapeutic Exercise Min 25 35 35 10   Manual Therapy Min 10 10 10 15   Total Timed Procedures 35 45 45 45   Total Service Procedures 35 45 45 45   Total Time 35 45 45 45         HEP  Cognitive Recovery strategies:  W.R.A.P  Daily orientiation  Shoulder ROM   Elbow ROM   Wrist PROM    Charges     TEx1, NREx1, MTx1

## 2025-07-16 ENCOUNTER — OFFICE VISIT (OUTPATIENT)
Dept: PHYSICAL THERAPY | Facility: HOSPITAL | Age: 70
End: 2025-07-16
Attending: Other
Payer: MEDICARE

## 2025-07-16 ENCOUNTER — OFFICE VISIT (OUTPATIENT)
Dept: OCCUPATIONAL MEDICINE | Facility: HOSPITAL | Age: 70
End: 2025-07-16
Attending: Other
Payer: MEDICARE

## 2025-07-16 PROCEDURE — 97112 NEUROMUSCULAR REEDUCATION: CPT

## 2025-07-16 PROCEDURE — 97110 THERAPEUTIC EXERCISES: CPT

## 2025-07-16 NOTE — PROGRESS NOTES
Patient: Federica Crowell (70 year old, female) Referring Provider:  Insurance:   Diagnosis: Status post CVA (Z86.73) Issa Itharry  MEDICARE   Date of Surgery: No data recorded Next MD visit:  [facility]   Precautions:  Fall Risk No data recorded Referral Information:    Date of Evaluation: Req: 0, Auth: 0, Exp:     06/12/25 POC Auth Visits:  10     Discharge Summary  Pt has attended 10 visits in Physical Therapy.      Today's Date   7/16/2025    Subjective  Pt reports she has been trying her exercises at home, stands at the bathroom counter. She feels like she can get around in the bathroom easier.       Pain: pain not reported     Objective   Observation/Posture: AFO on RLE, hemiparetic RUE         ROM and Strength:  (* denotes performed with pain)       Myotome MMT    MMT (-/5)    R L   Hip Flex (L2) 4+ 5   Knee Ext (L3) 4 5   Ankle DF (L4) -- (not assessed due to AFO) 5   Ankle PF (S1) -- (not assessed due to AFO) 5   Knee flexion (S2) 4- 5         Balance and Functional Mobility:     Postural Control:  Romberg EO: level surface 30 sec; compliant surface       Romberg EC: level surface 30 sec; compliant surface       Gait: pt ambulates on level ground with decreased step length; decreased stance phase; stooped posture/forward lean; assistive device (Assistive device of small base quad cane, AFO on RLE. Decreased step length with LLE/decreased stance time on RLE, slight circumduction of RLE during swing phase)  Timed Up and Go (AD,time): 45 sec, w/ small base cane; Fall Risk: Yes   5x Sit -->Stand: 22 sec; Fall Risk: Yes     Frank Balance Scale      Item Description Score (0 worst-4 best)     4 1. Sitting to standing  4 2. Standing unsupported   4 3. Sitting unsupported   3 4. Standing to sitting   3 5. Transfers   4 6. Standing with eyes closed   4 7. Standing with feet together   3 8. Reaching forward with outstretched arm   3 9. Retrieving object from floor   3 10. Turning to look behind   2 11. Turning 360  degrees   1 12. Placing alternate foot on stool   1 13. Standing with one foot in front   1 14. Standing on one foot      Total 40/56 (less than 46/56 = fall risk)           Assessment  Federica has attended 10 visits in outpatient physical therapy. In this time, she has improved bilateral lower extremity strength, balance based on Frank Balance scale and improved functional strength with TUG and 5x STS. While she is still in fall risk categories, she has made excellent progress thus improving stability and independence for ADL's and home ambulation. Discussed with pt and  continuing with medical fitness program for further strengthening and return to PT after 6 months - 1 year. Stressed importance of regular exercise to maintain current strength gains an continue to progress. Discharge from skilled PT at this time.     Goals (to be met in 10 visits)      Not Met Progress Toward Partially Met Met   Pt will demonstrate improved knee extension strength to 4/5 to improve ability to rise from chair with equal weight shift to avoid excess strain on LLE.  [] [] [] [x]   Pt will perform TUG in <50 seconds with least restrictive AD, demonstrating improved gait speed for community ambulation. [] [] [] [x]   Pt will perform Frank with score of 38/56 or greater to reduce risk for falls. [] [] [] [x]   Pt will display improved gait mechanics with equal stance time and step length to improve gait speed for community ambulation.  [] [] [x] []   Pt will be independent and compliant with comprehensive HEP to maintain progress achieved in PT. [] [] [] [x]          Post FES Score  Post Score: 68.75 % (7/16/2025  4:08 PM)    Score and level of concern (post): 44 - high concern (7/16/2025  4:08 PM)    -68.75 % improvement    Plan: Discharge    Patient/Family/Caregiver was advised of these findings, precautions, and treatment options and has agreed to actively participate in planning and for this course of care.    Thank you for  your referral. If you have any questions, please contact me at Dept: [phone].    Sincerely,  Electronically signed by therapist: Olena Candelario PT, DPT    Certification From: 7/16/2025  To:10/14/2025      Treatment Last 4 Visits  Treatment Day: 10       7/2/2025 7/9/2025 7/14/2025 7/16/2025   Neuro Treatment   Therapeutic Exercise NuStep, level 5, 6 min   DL, #50, 3x10   SLP, #25, x15 bilat   LAQ, RLE, 2x10      NuStep, level 5, 6 min   New HEP  -Hip abduction 1 UE, 2x10 bilat  -Hip extension 1 UE, 2x10 bilat  Pt and spouse education: HEP for home to complete daily independently, plan for upcoming discharge for PT. Following PT, look into medical fitness program (handout provided) for continued strengthening. Take at least 6 months to continue to exercise independently, then if new PT goals or mobility concerns come up can return. Possible annual PT tune up as needed.        NuStep, level 5, 6 min   LAQ w/ 1# weights, 2x10   DL, #50, 3x10   SLP, #25, x15 bilat   HS curl w/ blue band, 2x10 LLE, x10 RLE with assist of LLE   NuStep, level 5, 6 min   FES questionnaire  Pt education: medical fitness program, exercise 3x per week, return to PT in 6month - year   Neuro Re-Education Mini lunge onto airex, x10 bilat  Standing on airex, head turns, 2x10 bilat   SLS, 3x10 sec bilat, light UE   4 in step up, RLE only, therapist assisting with stability, to work on weight shifting and quad retraining, 2x8   *Standing and seated rest for recovery    New HEP:  -Marching, 1 UE, 2x10  -Single leg balance 3x10 sec bilat 1 UE  -Weight shifting 1 UE, 2x10   *Standing and seated rest for recovery   NB, light UE, head turns and nods, 2x10 each way   Alternating taps to pods in semi "[employer] 4 laps  LLE tapping pods in semi "[employer] RLE in stance, x5  NB, light UE, head turns and nods, x10 each way  Stepping to random call out color dot on floor, multiple bouts, light UE as able Balance reassessment in objective    Therapeutic Exercise  Minutes 25 25 25 15   Neuro Re-Educ Minutes 15 19 13 18   Total Time Of Timed Procedures 40 44 38 33   Total Time Of Service-Based Procedures 0 0 0 0   Total Treatment Time 40 44 38 33   HEP  Access Code: [email or web address]  URL: [email or web address]  Date: 07/09/2025  Prepared by: Olena Welchin    Exercises  - Standing March with Counter Support  - 1 x daily - 7 x weekly - 2 sets - 10 reps  - Standing Hip Abduction with Counter Support  - 1 x daily - 7 x weekly - 2 sets - 10 reps  - Standing Hip Extension with Counter Support  - 1 x daily - 7 x weekly - 2 sets - 10 reps  - Standing Single Leg Stance with Counter Support  - 1 x daily - 7 x weekly - 1 sets - 5 reps - 10 sec hold  - Side to Side Weight Shift with Counter Support  - 1 x daily - 7 x weekly - 2 sets - 10 reps          HEP  Access Code: [email or web address]  URL: [email or web address]  Date: 07/09/2025  Prepared by: Olena Welchin    Exercises  - Standing March with Counter Support  - 1 x daily - 7 x weekly - 2 sets - 10 reps  - Standing Hip Abduction with Counter Support  - 1 x daily - 7 x weekly - 2 sets - 10 reps  - Standing Hip Extension with Counter Support  - 1 x daily - 7 x weekly - 2 sets - 10 reps  - Standing Single Leg Stance with Counter Support  - 1 x daily - 7 x weekly - 1 sets - 5 reps - 10 sec hold  - Side to Side Weight Shift with Counter Support  - 1 x daily - 7 x weekly - 2 sets - 10 reps    Charges  TE 1 NR 1

## 2025-07-16 NOTE — PROGRESS NOTES
Discharge Summary  Pt has attended 9 visits in Occupational Therapy.      Patient: Federica Crowell (70 year old, female) Referring Provider:  Insurance:   Diagnosis: Status post CVA (Z86.73) Issa Le  MEDICARE   Date of Surgery: No data recorded Next MD visit:  [facility]   Precautions:  Fall Risk n/a Referral Information:   Date of Injury: No data recorded Date of Evaluation: Req: 0, Auth: 0, Exp:     06/12/25 POC Auth Visits:          Today's Date   7/16/2025    Subjective  Patient presents to OT noting that she feels confident in her ability to continue mirror therapy at home. She expressed excitement when she observed all digits move slightly into flexion while attempting to make a fist, stating she felt very happy due to the hard work she has been putting in with the mirror exercises.       Pain: 0/10     Objective  Patient demonstrates 1/5 strength on manual muscle testing at the digital level of the R hand.    DISCHARGE  Hand ROM: Patient is able to make a full fist with BUE (passively with the R hand)     MMT: Patient is unable to actively more RUE  Shoulder   MMT (-/5)    R L     Flex  3/5  4+/5     Ext  3/5  5/5     Abd (C5)  3/5  4+/5         Neurological:  Cognition:   Overall Cognitive Status: impaired  Arousal/Alertness: appropriate responses to stimuli  Attention Span: attends with cues to redirect  Orientation Level: oriented to place; oriented to person; oriented to situation; disoriented to time  Memory: impaired working memory  Following Commands: follows one-step commands inconsistently  Initiation: appears intact  Motor Planning: intact  Perseveration: not present  Awareness of Errors: assistance required to identify errors made      Leroy Cognitive Assessment: 11/30 with deficits involving visuospacial and executive function, attention, language, abstraction, delayed attention, and orientation     Perception: intact   Overall Perception Status:  WFL       Coordination:   Finger to  Nose: intact with LUE  ---------------------------------------------------   EVALUATION     Hand ROM: Patient is able to make a full fist with BUE (passively with the R hand)     MMT: Patient is unable to actively more RUE  Shoulder   MMT (-/5)    R L     Flex  3/5  4+/5     Ext  3/5  5/5     Abd (C5)  3/5  4+/5        Neurological:  Cognition:   Overall Cognitive Status: impaired  Arousal/Alertness: appropriate responses to stimuli  Attention Span: attends with cues to redirect  Orientation Level: oriented to place; oriented to person; oriented to situation; disoriented to time  Memory: impaired working memory  Following Commands: follows one-step commands inconsistently  Initiation: appears intact  Motor Planning: intact  Perseveration: not present  Awareness of Errors: assistance required to identify errors made      Hollister Cognitive Assessment: 11/30 with deficits involving visuospacial and executive function, attention, language, abstraction, delayed attention, and orientation     Perception: intact   Overall Perception Status:  WFL       Coordination:   Finger to Nose: intact with LUE    Assessment  Prior to today’s session, only minimal muscle twitches were observed without any active movement. Today, however, the patient was able to initiate approximately 5-10 degrees of flexion in the digits of the R hand when attempting to make a fist. This marks a meaningful gain in motor function. Her motivation, consistent participation in home programming, and dedicated focus on recovery are strong indicators that she will continue to make progress independently. She was also educated on the implications of Botox and encouraged to discuss with her physician whether continuation is appropriate now that some return of function is being observed.    Goals (to be met in 12 visits)   Pt will improve safety awareness and judgement to promote independence in the home and community.  (MET)  Pt will increase endurance and  activity tolerance to 30 minutes with no breaks to support participation in ADL/IADL.  (MET)  Pt will be independent and compliant with comprehensive HEP to maintain progress achieved in OT. (MET)  Pt will improve sustained attention by independently completing a familiar task for 30 minutes with no breaks to support independence and success in activities like leisure and social participation.  (MET)          Treatment Last 4 Visits        7/2/2025 7/9/2025 7/14/2025 7/16/2025   OT Treatment   Treatment Day  7 8 9   Therapeutic Exercise   -LUE yellow flexbar (pronationsupinationflexionextension)  REASESSMENT    Neuro Re-Educ Mirror therapy: RUE blocked from vision with LUE completing:  - full hand grasp   - sponge  (finger adduction + fist)  - marble sorting  Mirror therapy: RUE blocked from vision with LUE completing:  - full hand grasp   - sponge  (finger adduction + fist)  - marble sorting  Mirror therapy: RUE blocked from vision with LUE completing:  - full hand grasp   - sponge  (finger adduction + fist)  - marble sorting  irror therapy: RUE blocked from vision with LUE completing:  - full hand grasp   - sponge  (finger adduction + fist)  - marble sorting    Manual Therapy Passive ROM: elbow flex/ext ; shoulder flex/ext/shoulder abd; wrist flex/ext/uln dev/ radial dev Passive ROM: elbow flex/ext ; shoulder flex/ext/shoulder abd; wrist flex/ext/uln dev/ radial dev Passive ROM: elbow flex/ext ; shoulder flex/ext/shoulder abd; wrist flex/ext/uln dev/ radial dev    Neuro Re-Ed Min   20 30   Therapeutic Exercise Min 35 35 10 15   Manual Therapy Min 10 10 15    Total Timed Procedures 45 45 45 45   Total Service Procedures 45 45 45 45   Total Time 45 45 45 45         HEP  Cognitive Recovery strategies:  W.R.A.P  Daily orientiation  Shoulder ROM   Elbow ROM   Wrist PROM    Charges     TEX1, NMREDx2    Plan: Discontinue OT with the expectation that patient will continue her HEP and mirror  therapy independently. Recommend reassessment in 1-2 months to evaluate progress and determine readiness for continued intervention to further functional gains.       Patient/Family/Caregiver was advised of these findings, precautions, and treatment options and has agreed to actively participate in planning and for this course of care.    Thank you for your referral. If you have any questions, please contact me at Dept: [phone].    Sincerely,  Electronically signed by therapist: Lis Bennett, OT     Physician's certification required:  No  Please co-sign or sign and return this letter via fax as soon as possible to [phone].   I certify the need for these services furnished under this plan of treatment and while under my care.    X___________________________________________________ Date____________________    Certification From: 7/18/2025  To:10/16/2025

## 2025-07-21 ENCOUNTER — APPOINTMENT (OUTPATIENT)
Dept: OCCUPATIONAL MEDICINE | Facility: HOSPITAL | Age: 70
End: 2025-07-21
Attending: Other
Payer: MEDICARE

## 2025-07-21 ENCOUNTER — APPOINTMENT (OUTPATIENT)
Dept: PHYSICAL THERAPY | Facility: HOSPITAL | Age: 70
End: 2025-07-21
Attending: Other
Payer: MEDICARE

## 2025-08-05 ENCOUNTER — TELEPHONE (OUTPATIENT)
Dept: NEUROLOGY | Age: 70
End: 2025-08-05

## (undated) DEVICE — PEN: MARKING STD PT 100/CS: Brand: MEDICAL ACTION INDUSTRIES

## (undated) DEVICE — GAUZE TRAY STERILE 4X4 12PLY

## (undated) DEVICE — GAMMEX® PI HYBRID SIZE 7, STERILE POWDER-FREE SURGICAL GLOVE, POLYISOPRENE AND NEOPRENE BLEND: Brand: GAMMEX

## (undated) DEVICE — 60 ML SYRINGE LUER-LOCK TIP: Brand: MONOJECT

## (undated) DEVICE — NEEDLE SPINAL 25X3-1/2 BLUE

## (undated) DEVICE — TOWEL SURG OR 17X30IN BLUE

## (undated) DEVICE — STANDARD HYPODERMIC NEEDLE,POLYPROPYLENE HUB: Brand: MONOJECT

## (undated) DEVICE — SWABSTICK PVP IOD 4IN PDI PRP

## (undated) DEVICE — PENCAN® 22 GA. X 3-1/2 (90 MM) SPINAL NEEDLE: Brand: PENCAN®

## (undated) DEVICE — ISOVUE-M200 IOPAMIDOL 41% 10ML

## (undated) DEVICE — 12 ML SYRINGE LUER-LOCK TIP: Brand: MONOJECT

## (undated) NOTE — LETTER
Patient Name: Federica Crowell  YOB: 1955          MRN number:  [id number]  Date:  12/10/2024  Referring Physician:  Issa Le         OCCUPATIONAL THERAPY NEUROLOGICAL EVALUATION:.     Diagnosis:   Spasticity as late effect of cerebrovascular accident (CVA) (I69.398,R25.2)       Referring Provider: Issa Le  Date of Evaluation:    12/6/2024    Precautions:   Hx of CVA , HTN Next MD visit:   none scheduled  Date of Surgery: n/a     PATIENT SUMMARY   Federica Crowell is a 69 year old female who presents to therapy today with complaints of R hemiparesis.  Hx of Condition: On 12/10/23, pt woke up with pain and inability to walk as well as difficulty with her RUE.  decided to call 911, pt was admitted for a CVA resulting in R hemiparesis. Pt was admitted from 12/10-18th to [place] then transferred to [facility] from 12/18-01/16. Pt participated in  rehab. Pt has been in and out of rehab services for the last year at multiple facilities. Pt has a hx of Botox for two rounds. Last Botox was in October 2024 and she is scheduled for her next Botox January.     Pt describes pain level: current 0/10, best 0/10, worst 0/10.    Current functional limitations include ambulating with cane, long distances using w/c,  assisting with all adls and iadls.  Federica describes prior level of function independent in adl. Pt goals include regain more functioning within her RUE.  Past medical history was reviewed with Federica. Significant findings include No past medical history on file.      ASSESSMENT  Federica presents to occupational therapy evaluation with primary c/o Right hemiparesis. At this time pt has active initiation of her trapezius, levator scapulae, and rhomboids resulting in ability to elevate and retract her R scapula. Pt is also able to minimally abduct her R shoulder through activation of her deltoid. The results of the objective tests and measures show min AROM of pt's R shoulder,  no AROM of her elbow, wrist, and/or digits at this time. Functional deficits include but are not limited to inability to use RUE for adls and iadls. Signs and symptoms are consistent with diagnosis of Spasticity as late effect of cerebrovascular accident. Pt and OT discussed evaluation findings, pathology, POC and HEP.  Pt voiced understanding and performs HEP correctly without reported pain. Skilled Occupational Therapy is medically necessary to address the above impairments and reach functional goals.    OBJECTIVE   Observation: Ambulating with cane.     Cognition: Overall Cognitive Status:  WFL - within functional limits    Vision: Current Vision: no visual deficits noted      Sensory: WNL    Spasticity: Mild flexor tone      Orthotics: none    Edema: none    ROM: WNL KATELYN UE except below  Shoulder  Elbow Wrist Hand   Flexion: R 40  Abduction: R 45; L  Flexion: R 0  Extension: R 0  Supination: R 0  Pronation: R 0 Flexion: R 0  Extension: R 0  Ulnar Deviation: R 0  Radial Deviation R 0 MP: R 0  PIP: R 0  DIP: R 0     STRENGTH/MMT: 5/5 KATELYN UE except below:  Shoulder Elbow Wrist   Flexion: R 2+/5; L 5/5  Abduction: R 2+/5; L 5/5  ER: R 0/5; L 5/5  IR: R 0/5; L 5/5 Flexion: R 0/5  Extension: R 0/5  Supination: R 0/5  Pronation: R 0/5 Flexion: R 0/0, L 5/5  Extension: R 0/0, L 5/5  Ulnar Deviation: R 0/0, L 5/5  Radial Deviation R 0/0, L 5/5     Strength (lbs) Right Average Left Average   : unable 39         Today's Treatment and Response:   Pt education was provided on exam findings, treatment diagnosis, treatment plan, expectations, and prognosis.  Patient was instructed in and issued a HEP for: Towel slides vertically, horizontally, circles    Charges: OT Eval Moderate Complexity, TE 1      Total Timed Treatment: 15 min     Total Treatment Time: 45 min     Based on clinical rationale and outcome measures, this evaluation involved Moderate Complexity decision making due to 1-2 personal factors/comorbidities, 4+  body structures involved/activity limitations, and evolving symptoms including  decline in functional independence .  PLAN OF CARE:    Goals: (to be met in 12 visits)  Pt will be independent and compliant with comprehensive HEP to maintain progress achieved in OT  Pt will increase their (R) shoulder flexion and abduction to at least 90 degrees for ease of dressing  Pt will increase their (R) shoulder flexion and abduction to at least 3+/5  for ease of transfers.   Pt will decrease their QuickDASH score by 10% in order to demo improvements in functional independence.       Frequency / Duration: Patient will be seen for  1-2x/week or a total of 12 visits over a 90 day period.  Treatment will include: Neuromuscular Re-education, Manual Therapy, Therapeutic Exercise, Therapeutic Activity, Electrical Stim, Splinting, Pt education, Home exercise program    Education or treatment limitation: Communication,  interpretered per wife's request  Rehab Potential:good    QuickDASH Outcome Score  Score: (Patient-Rptd) 61.36 % (12/9/2024 12:39 PM)    Patient/Family/Caregiver was advised of these findings, precautions, and treatment options and has agreed to actively participate in planning and for this course of care.    Thank you for your referral. Please co-sign or sign and return this letter via fax as soon as possible to [phone]. If you have any questions, please contact me at Dept: [phone]    Sincerely,  Electronically signed by therapist: Nancy Butts OT  Physician's certification required: Yes  I certify the need for these services furnished under this plan of treatment and while under my care.    X___________________________________________________ Date____________________    Certification From: 12/6/2024  To:3/6/2025      21st Century Cures Act Notice to Patient: Medical documents like this are made available to patients in the interest of transparency. However, be advised this is a medical document and  it is intended as [id number] communication between your medical providers. This medical document may contain abbreviations, assessments, medical data, and results or other terms that are unfamiliar. Medical documents are intended to carry relevant information, facts as evident, and the clinical opinion of the practitioner. As such, this medical document may be written in language that appears blunt or direct. You are encouraged to contact your medical provider and/or [facility] Patient Experience if you have any questions about this medical document.

## (undated) NOTE — LETTER
Patient Name: Federica Crowell  YOB: 1955          MRN :  [id number]  Date:  7/18/2025  Referring Physician:  Issa Le    Discharge Summary  Pt has attended 9 visits in Occupational Therapy.      Patient: Federica Crowell (70 year old, female) Referring Provider:  Insurance:   Diagnosis: Status post CVA (Z86.73) Issa Le  MEDICARE   Date of Surgery: No data recorded Next MD visit:  [employer]   Precautions:  Fall Risk n/a Referral Information:   Date of Injury: No data recorded Date of Evaluation: Req: 0, Auth: 0, Exp:     06/12/25 POC Auth Visits:          Today's Date   7/16/2025    Subjective  Patient presents to OT noting that she feels confident in her ability to continue mirror therapy at home. She expressed excitement when she observed all digits move slightly into flexion while attempting to make a fist, stating she felt very happy due to the hard work she has been putting in with the mirror exercises.       Pain: 0/10     Objective  Patient demonstrates 1/5 strength on manual muscle testing at the digital level of the R hand.    DISCHARGE  Hand ROM: Patient is able to make a full fist with BUE (passively with the R hand)     MMT: Patient is unable to actively more RUE  Shoulder   MMT (-/5)    R L     Flex  3/5  4+/5     Ext  3/5  5/5     Abd (C5)  3/5  4+/5         Neurological:  Cognition:   Overall Cognitive Status: impaired  Arousal/Alertness: appropriate responses to stimuli  Attention Span: attends with cues to redirect  Orientation Level: oriented to place; oriented to person; oriented to situation; disoriented to time  Memory: impaired working memory  Following Commands: follows one-step commands inconsistently  Initiation: appears intact  Motor Planning: intact  Perseveration: not present  Awareness of Errors: assistance required to identify errors made      [place] Cognitive Assessment: 11/30 with deficits involving visuospacial and executive function, attention,  language, abstraction, delayed attention, and orientation     Perception: intact   Overall Perception Status:  WFL       Coordination:   Finger to Nose: intact with LUE  ---------------------------------------------------   EVALUATION     Hand ROM: Patient is able to make a full fist with BUE (passively with the R hand)     MMT: Patient is unable to actively more RUE  Shoulder   MMT (-/5)    R L     Flex  3/5  4+/5     Ext  3/5  5/5     Abd (C5)  3/5  4+/5        Neurological:  Cognition:   Overall Cognitive Status: impaired  Arousal/Alertness: appropriate responses to stimuli  Attention Span: attends with cues to redirect  Orientation Level: oriented to place; oriented to person; oriented to situation; disoriented to time  Memory: impaired working memory  Following Commands: follows one-step commands inconsistently  Initiation: appears intact  Motor Planning: intact  Perseveration: not present  Awareness of Errors: assistance required to identify errors made      [place] Cognitive Assessment: 11/30 with deficits involving visuospacial and executive function, attention, language, abstraction, delayed attention, and orientation     Perception: intact   Overall Perception Status:  WFL       Coordination:   Finger to Nose: intact with LUE    Assessment  Prior to today’s session, only minimal muscle twitches were observed without any active movement. Today, however, the patient was able to initiate approximately 5-10 degrees of flexion in the digits of the R hand when attempting to make a fist. This marks a meaningful gain in motor function. Her motivation, consistent participation in home programming, and dedicated focus on recovery are strong indicators that she will continue to make progress independently. She was also educated on the implications of Botox and encouraged to discuss with her physician whether continuation is appropriate now that some return of function is being observed.    Goals (to be met in 12  visits)   Pt will improve safety awareness and judgement to promote independence in the home and community.  (MET)  Pt will increase endurance and activity tolerance to 30 minutes with no breaks to support participation in ADL/IADL.  (MET)  Pt will be independent and compliant with comprehensive HEP to maintain progress achieved in OT. (MET)  Pt will improve sustained attention by independently completing a familiar task for 30 minutes with no breaks to support independence and success in activities like leisure and social participation.  (MET)          Treatment Last 4 Visits        7/2/2025 7/9/2025 7/14/2025 7/16/2025   OT Treatment   Treatment Day  7 8 9   Therapeutic Exercise   -LUE yellow flexbar (pronationsupinationflexionextension)  REASESSMENT    Neuro Re-Educ Mirror therapy: RUE blocked from vision with LUE completing:  - full hand grasp   - sponge  (finger adduction + fist)  - marble sorting  Mirror therapy: RUE blocked from vision with LUE completing:  - full hand grasp   - sponge  (finger adduction + fist)  - marble sorting  Mirror therapy: RUE blocked from vision with LUE completing:  - full hand grasp   - sponge  (finger adduction + fist)  - marble sorting  irror therapy: RUE blocked from vision with LUE completing:  - full hand grasp   - sponge  (finger adduction + fist)  - marble sorting    Manual Therapy Passive ROM: elbow flex/ext ; shoulder flex/ext/shoulder abd; wrist flex/ext/uln dev/ radial dev Passive ROM: elbow flex/ext ; shoulder flex/ext/shoulder abd; wrist flex/ext/uln dev/ radial dev Passive ROM: elbow flex/ext ; shoulder flex/ext/shoulder abd; wrist flex/ext/uln dev/ radial dev    Neuro Re-Ed Min   20 30   Therapeutic Exercise Min 35 35 10 15   Manual Therapy Min 10 10 15    Total Timed Procedures 45 45 45 45   Total Service Procedures 45 45 45 45   Total Time 45 45 45 45         HEP  Cognitive Recovery strategies:  W.R.A.P  Daily orientiation  Shoulder  ROM   Elbow ROM   Wrist PROM    Charges     TEX1, NMREDx2    Plan: Discontinue OT with the expectation that patient will continue her HEP and mirror therapy independently. Recommend reassessment in 1-2 months to evaluate progress and determine readiness for continued intervention to further functional gains.       Patient/Family/Caregiver was advised of these findings, precautions, and treatment options and has agreed to actively participate in planning and for this course of care.    Thank you for your referral. If you have any questions, please contact me at Dept: [phone].    Sincerely,  Electronically signed by therapist: Lis Bennett OT     Physician's certification required:  No  Please co-sign or sign and return this letter via fax as soon as possible to [phone].   I certify the need for these services furnished under this plan of treatment and while under my care.    X___________________________________________________ Date____________________    Certification From: 7/18/2025  To:10/16/2025              21st Century Cures Act Notice to Patient: Medical documents like this are made available to patients in the interest of transparency. However, be advised this is a medical document and it is intended as dpqa-ss-sdyq communication between your medical providers. This medical document may contain abbreviations, assessments, medical data, and results or other terms that are unfamiliar. Medical documents are intended to carry relevant information, facts as evident, and the clinical opinion of the practitioner. As such, this medical document may be written in language that appears blunt or direct. You are encouraged to contact your medical provider and/or [facility] Patient Experience if you have any questions about this medical document.

## (undated) NOTE — LETTER
Patient Name: Federica Crowell  YOB: 1955          MRN number:  [id number]  Date:  6/15/2025  Referring Physician:  Issa Le           OT NEURO EVALUATION:     Patient:  Federica Crowell (70 year old, female)   Diagnosis:   Status post CVA (Z86.73)     Referring Provider: Issa Le  Today's Date   6/12/2025    Precautions:  Fall Risk   Date of Evaluation: 06/12/25       PATIENT SUMMARY     Summary of chief complaints: bilateral UE weakness and difficulties with cognition  History of current condition: Patient reports receiving monthly Botox injections to the right arm and leg. She previously attended outpatient therapy at [facility] for gait and balance but was unable to return due to scheduling conflicts in coordinating both PT and OT this round. Patient presents with right-sided hemiparesis and mild bilateral upper extremity weakness. She also reports cognitive challenges and expresses a desire to improve cognitive function through therapy.   Pain level: current 0 /10, at best 0 /10, at worst 5 /10  Description of symptoms: R arm is heavy, clikcing in the R shoulder   Current limitations: limited coordination and strength  Pt goals: increase overall ROM  Hand Dominance: right    Past medical history was reviewed with Federica.  Significant findings include:    Federica  has no past medical history on file.  She  has a past surgical history that includes removal gallbladder.    ASSESSMENT  Federica presents to occupational therapy evaluation with primary c/o bilateral UE weakness and difficulties with cognition. The results of the objective tests and measures show limites strength and difficulties with cognitive processes involving visuospacial and executive function, attention, language, abstraction, delayed attention, and orientation. Functional deficits include but are not limited to  . Signs and symptoms are consistent with diagnosis of Status post CVA (Z86.73). Pt and OT discussed  evaluation findings, pathology, POC and HEP.  Pt voiced understanding and performs HEP correctly without reported pain. Skilled Occupational Therapy is medically necessary to address the above impairments and reach functional goals.    OBJECTIVE:      Musculoskeletal  There were no vitals taken for this visit.  Observation:    Orthotics:    Edema:       ROM and Strength:  (* denotes performed with pain)    MMT will be assessed at next visit    Shoulder   MMT (-/5)    R L     Flex         Ext         Abd (C5)         IR         ER            Elbow   MMT (-/5)    R L     Flex (C6)         Ext (C7)         Pronation         Supination          Wrist   MMT (-/5)    R L     Flex (C7)         Ext (C6)         Ulnar Dev         Radial Dev            Hand ROM: Patient is able to make a full fist with BUE.      Will be assessed at following visit:  Hand Strength (lbs) R L             2 pt Pinch        3 pt Pinch        Lateral pinch          Neurological:  Cognition:   Overall Cognitive Status: impaired  Arousal/Alertness: appropriate responses to stimuli  Attention Span: attends with cues to redirect  Orientation Level: oriented to place; oriented to person; oriented to situation; disoriented to time  Memory: impaired working memory  Following Commands: follows one-step commands inconsistently  Initiation: appears intact  Motor Planning: intact  Perseveration: not present  Awareness of Errors: assistance required to identify errors made      [place] Cognitive Assessment: 11/30 with deficits involving visuospacial and executive function, attention, language, abstraction, delayed attention, and orientation     Perception: Assessed at following visit    Overall Perception Status:    Left Right Discrimination:    Body Scheme:    Left Attention:    Right Attention:     Position in Space:    Form Constancy:      Visual Closure:    Figure Ground:    Depth Perception:    Spatial Orientation:    Topographical Orientation:     Visual Discrimination:         Coordination:   Finger to Nose:  Will be assessed at next visit    ADLs/IADLs:  ADL's    Bathing: Dependent     Dressing: Dependent     Feeding: Min A     Grooming: DependentDependent  IADL's     Homemaking: Dependent     Food Prep: Dependent     Driving: Dependent   Other Functional Mobility/ADL Comments:        Today's Treatment and Response:   Pt education was provided on exam findings, treatment diagnosis, treatment plan, expectations, and prognosis.    Today's Treatment       6/12/2025   OT Treatment   Therapeutic Exercise HEP Review    Therapeutic Exercise Min 10   Eval Min 35   Total Timed Procedures 10   Total Service Procedures 45   Total Time 45         Patient was instructed in and issued a HEP for:   Cognitive Recovery strategies:  W.R.A.P  Daily orientiation     Charges:  OT EVAL Low Complexity, x1, TEx1   Based on analysis of data from a problem-focused assessment from a brief chart review, clinical presentation of physical, cognitive and psychosocial skills, as well as review of patient rated outcome measures, this evaluation involved Low complexity decision making, with 1-3 occupational performance component deficits, no comorbidities, and no need for modification of tasks or assistance with assessment.                                                                PLAN OF CARE:      Goals: (to be met in 12 visits)   Pt will improve safety awareness and judgement to promote independence in the home and community.    Pt will increase endurance and activity tolerance to 30 minutes with no breaks to support participation in ADL/IADL.    Pt will be independent and compliant with comprehensive HEP to maintain progress achieved in OT.   Pt will improve sustained attention by independently completing a familiar task for 30 minutes with no breaks to support independence and success in activities like leisure and social participation.       Frequency / Duration: Patient will  be seen 1-2 x/week or a total of 12 visits over a 90 day period. Treatment will include: Manual Therapy; Neuromuscular Re-education; Therapeutic Activities; Self-Care Home Management; Therapeutic Exercise; Home Exercise Program instruction; Electrical stimulation (attended); Ultrasound    Education or treatment limitation: Cognition; Communication   Rehab Potential: good     QuickDASH Outcome Score  Score: (Proxy-Rptd) 9.09 % (6/12/2025  2:46 PM)      Patient/Family/Caregiver was advised of these findings, precautions, and treatment options and has agreed to actively participate in planning and for this course of care.    Thank you for your referral. Please co-sign or sign and return this letter via fax as soon as possible to [phone]. If you have any questions, please contact me at Dept: [phone]    Sincerely,  Electronically signed by therapist: Lis Bennett OT  Physician's certification required: Yes  I certify the need for these services furnished under this plan of treatment and while under my care.    X___________________________________________________ Date____________________    Certification From: 6/12/2025  To: 9/10/2025          21st Century Cures Act Notice to Patient: Medical documents like this are made available to patients in the interest of transparency. However, be advised this is a medical document and it is intended as qsqz-wp-ifol communication between your medical providers. This medical document may contain abbreviations, assessments, medical data, and results or other terms that are unfamiliar. Medical documents are intended to carry relevant information, facts as evident, and the clinical opinion of the practitioner. As such, this medical document may be written in language that appears blunt or direct. You are encouraged to contact your medical provider and/or [facility] Patient Experience if you have any questions about this medical document.

## (undated) NOTE — LETTER
Patient Name: Federica Crowell  YOB: 1955          MRN :  [id number]  Date:  11/19/2024  Referring Physician:  Issa Le-     Thank you for referring your patient to physical therapy.     I am attaching her evaluation for co-signature per Medicare guidelines. Please co-sign and return via fax at [phone].     I also called and left a message requesting an order for:  \"Left AFO, eval and treat\".     While she currently has an AFO, she is in need of adjustments as the current one is much too big for her. Apparently she was quite swollen when it was first issued. Likely they will be able to adjust it without making a new one but this will be determined ultimately by the orthotist's evaluation.     If you fax the order to the same number above that would be great. I can then coordinate with the patient and orthotist from there.       Thank you in advance,       Kaylie Barros PT, DPT, [facility]                 21st Century Cures Act Notice to Patient: Medical documents like this are made available to patients in the interest of transparency. However, be advised this is a medical document and it is intended as fokk-zl-genv communication between your medical providers. This medical document may contain abbreviations, assessments, medical data, and results or other terms that are unfamiliar. Medical documents are intended to carry relevant information, facts as evident, and the clinical opinion of the practitioner. As such, this medical document may be written in language that appears blunt or direct. You are encouraged to contact your medical provider and/or [facility] Patient Experience if you have any questions about this medical document.

## (undated) NOTE — LETTER
Patient Name: Federica Crowell  YOB: 1955          MRN number:  [id number]  Date:  6/12/2025  Referring Physician:  Issa Le           NEUROLOGICAL EVALUATION:     Diagnosis:   Status post CVA (Z86.73) Patient:  Federica Crowell (70 year old, female)        Referring Provider: Issa Le  Today's Date   6/12/2025    Precautions:  Fall Risk Date of Evaluation: 06/12/25       PATIENT SUMMARY     Summary of chief complaints: decreased mobility  History of current condition: Pt gets botox in the arm and leg every month.. Pt was seeing therapy over at [facility] for the walking and balance. She was unable to return to [facility] to coordinate both PT and OT this round of therapy. Right side is hemiparetic. She has AFO on the R ankle, can walk with this on. Uses a quad base cane to walk. During the day she walks around with the cane. Feels like everything is about the same when she finished last therapy. She did look at getting the hinged AFO, however orthotist determined this would not work due to strength issues. Pt would like to work on improving R quad strength for walking and ability to negotiate bathroom safer.   Pain level: current 0 /10, at best 0 /10, at worst 0 /10  Description of symptoms:     Occupation: retired   Leisure activities/Hobbies:     Prior level of function: prior to stroke, no limitations  Current limitations: assist needed for ADL activites, limited walking endurance  Pt goals: wants to work on strength and walking  History of falls: Yes one fall April 2024, thinks she was walking too fast.   Home Environment: everything on one level, 5 stairs to get into the home   ADLs: needs cane or hand hold assist to walk,  helps with ADL activities     Past medical history was reviewed with Federica.  Significant findings include:    Imaging/Tests:     Federica  has no past medical history on file.  She  has a past surgical history that includes removal  gallbladder.    ASSESSMENT  Federica presents to physical therapy evaluation with primary c/o decreased mobility. The results of the objective tests and measures show at increased risk for falls based on 5x STS, TUG and Frank, decreased RLE strength, assist needed for ADL activites, slow walking speed, abnormal gait mechanics listed below. Functional deficits include but are not limited to assist needed for ADL activites, limited walking endurance. Signs and symptoms are consistent with diagnosis of Status post CVA (Z86.73). Pt and PT discussed evaluation findings, pathology, POC and HEP.  Pt voiced understanding and performs HEP correctly without reported pain. Skilled Physical Therapy is medically necessary to address the above impairments and reach functional goals.    OBJECTIVE:      Musculoskeletal:  Observation/Posture: AFO on RLE, hemiparetic RUE     ROM and Strength:  (* denotes performed with pain)  Myotome MMT   MMT (-/5)    R L   Hip Flex (L2) 4 4+   Knee Ext (L3) 3+ 5   Ankle DF (L4) -- (not assessed due to AFO) 5   Ankle PF (S1) -- (not assessed due to AFO) 5   Knee flexion (S2) 2 4       Neurological:  Coordination:  Finger to Nose: NT   Pronation/Supination: NT   Toe Tap: NT     Sensation:       SILT     Deep Tendon Reflexes: WNL except     Tone: WNL except       Pathological Reflexes:  Babinski: NT   Clonus: NT   Sandhu's Sign: NT     Balance and Functional Mobility:    Postural Control:  Romberg EO: level surface 30 sec; compliant surface       Romberg EC: level surface 30 sec; compliant surface    Tandem Stance: R back: 30 sec (Modified tandem); L back: 22 sec (Modified tandem); Fall Risk: Yes  SLS: R: 0 sec; L: 0 sec; Fall Risk: Yes    Gait: pt ambulates on level ground with decreased step length; decreased stance phase; stooped posture/forward lean; assistive device (Assistive device of small base quad cane, AFO on RLE. Decreased step length with LLE/decreased stance time on RLE, slight  circumduction of RLE during swing phase)  Timed Up and Go (AD,time): 57 sec; Fall Risk: Yes  Functional Gait Assessment Score:  ; Fall Risk:    5x Sit -->Stand: 29 sec (pushing off knee with L hand); Fall Risk: Yes    Frank Balance Scale     Item Description Score (0 worst-4 best)    3 1. Sitting to standing  2 2. Standing unsupported   4 3. Sitting unsupported   3 4. Standing to sitting   3 5. Transfers   3 6. Standing with eyes closed   2 7. Standing with feet together   2 8. Reaching forward with outstretched arm   3 9. Retrieving object from floor   2 10. Turning to look behind   2 11. Turning 360 degrees   1 12. Placing alternate foot on stool   2 13. Standing with one foot in front   0 14. Standing on one foot     Total 33/56 (less than 46/56 = fall risk)      Today's Treatment and Response:   Pt education was provided on exam findings, treatment diagnosis, treatment plan, expectations, and prognosis.    Today's Treatment       6/12/2025   Neuro Treatment   Therapeutic Exercise Pt education: findings of exam consistent with recent discharge from therapy in March, goals for PT this round, will start with 6 visits and reassess    Therapeutic Exercise Minutes 8   Evaluation Minutes 30   Total Time Of Timed Procedures 8   Total Time Of Service-Based Procedures 30   Total Treatment Time 38        Patient was instructed in and issued a HEP for:  continue HEP from previous PT    Charges:  PT EVAL: Moderate Complexity, Eval 1 TE 1  In agreement with evaluation findings and clinical rationale, this evaluation involved MODERATE COMPLEXITY decision making due to 1-2 personal factors/comorbidities, 3 or more body structures involved/activity limitations, and evolving symptoms as documented in the evaluation.                                                        PLAN OF CARE:      Goals: (to be met in 6 visits)    Not Met Progress Toward Partially Met Met   Pt will demonstrate improved knee extension strength to 4/5 to  improve ability to rise from chair with equal weight shift to avoid excess strain on LLE.  [] [] [] []   Pt will perform TUG in <50 seconds with least restrictive AD, demonstrating improved gait speed for community ambulation. [] [] [] []   Pt will perform Frank with score of 38/56 or greater to reduce risk for falls. [] [] [] []   Pt will display improved gait mechanics with equal stance time and step length to improve gait speed for community ambulation.  [] [] [] []   Pt will be independent and compliant with comprehensive HEP to maintain progress achieved in PT. [] [] [] []         Frequency / Duration: Patient will be seen 1-2x/week or a total of 6 visits over a 90 day period. Treatment will include: Gait training; Manual Therapy; Neuromuscular Re-education; Self-Care Home Management; Therapeutic Activities; Therapeutic Exercise; Home Exercise Program instruction; Electrical stimulation (unattended); Electrical stimulation (attended); Patient/Family Education    Education or treatment limitation: Communication   Rehab Potential: fair     QuickDASH Outcome Score  Score: (Proxy-Rptd) 9.09 % (6/12/2025  2:46 PM)      Patient/Family/Caregiver was advised of these findings, precautions, and treatment options and has agreed to actively participate in planning and for this course of care.    Thank you for your referral. Please co-sign or sign and return this letter via fax as soon as possible to [phone]. If you have any questions, please contact me at Dept: [phone]    Sincerely,  Electronically signed by therapist: Olena Candelario PT, DPT  Physician's certification required: Yes  I certify the need for these services furnished under this plan of treatment and while under my care.    X___________________________________________________ Date____________________    Certification From: 6/12/2025  To: 9/10/2025          21st Century Cures Act Notice to Patient: Medical documents like this are made available to  patients in the interest of transparency. However, be advised this is a medical document and it is intended as [id number] communication between your medical providers. This medical document may contain abbreviations, assessments, medical data, and results or other terms that are unfamiliar. Medical documents are intended to carry relevant information, facts as evident, and the clinical opinion of the practitioner. As such, this medical document may be written in language that appears blunt or direct. You are encouraged to contact your medical provider and/or [facility] Patient Experience if you have any questions about this medical document.